# Patient Record
Sex: FEMALE | Race: WHITE
[De-identification: names, ages, dates, MRNs, and addresses within clinical notes are randomized per-mention and may not be internally consistent; named-entity substitution may affect disease eponyms.]

---

## 2017-04-26 ENCOUNTER — HOSPITAL ENCOUNTER (OUTPATIENT)
Dept: HOSPITAL 58 - AMBL | Age: 77
Discharge: HOME | End: 2017-04-26
Attending: INTERNAL MEDICINE

## 2017-04-26 ENCOUNTER — APPOINTMENT (OUTPATIENT)
Dept: GENERAL RADIOLOGY | Age: 77
End: 2017-04-26
Payer: MEDICARE

## 2017-04-26 ENCOUNTER — HOSPITAL ENCOUNTER (EMERGENCY)
Age: 77
Discharge: HOME | End: 2017-04-26
Attending: FAMILY MEDICINE
Payer: MEDICARE

## 2017-04-26 VITALS
SYSTOLIC BLOOD PRESSURE: 164 MMHG | DIASTOLIC BLOOD PRESSURE: 78 MMHG | TEMPERATURE: 98.3 F | OXYGEN SATURATION: 95 % | HEART RATE: 80 BPM | RESPIRATION RATE: 18 BRPM | WEIGHT: 125 LBS | HEIGHT: 61 IN | BODY MASS INDEX: 23.6 KG/M2

## 2017-04-26 VITALS — BODY MASS INDEX: 23.6 KG/M2

## 2017-04-26 DIAGNOSIS — R11.0: ICD-10-CM

## 2017-04-26 DIAGNOSIS — R53.1: Primary | ICD-10-CM

## 2017-04-26 DIAGNOSIS — R50.9: ICD-10-CM

## 2017-04-26 DIAGNOSIS — M54.41 CHRONIC BILATERAL LOW BACK PAIN WITH BILATERAL SCIATICA: ICD-10-CM

## 2017-04-26 DIAGNOSIS — R82.71 BACTERIA IN URINE: Primary | ICD-10-CM

## 2017-04-26 DIAGNOSIS — G89.29 CHRONIC BILATERAL LOW BACK PAIN WITH BILATERAL SCIATICA: ICD-10-CM

## 2017-04-26 DIAGNOSIS — R68.89: ICD-10-CM

## 2017-04-26 DIAGNOSIS — M54.42 CHRONIC BILATERAL LOW BACK PAIN WITH BILATERAL SCIATICA: ICD-10-CM

## 2017-04-26 DIAGNOSIS — I73.9: ICD-10-CM

## 2017-04-26 LAB
ALBUMIN SERPL-MCNC: 4.1 G/DL (ref 3.5–5.2)
ALP BLD-CCNC: 72 U/L (ref 35–104)
ALT SERPL-CCNC: 5 U/L (ref 5–33)
AMPHETAMINE SCREEN, URINE: NEGATIVE
ANION GAP SERPL CALCULATED.3IONS-SCNC: 17 MMOL/L (ref 7–19)
AST SERPL-CCNC: 15 U/L (ref 5–32)
BACTERIA: ABNORMAL /HPF
BARBITURATE SCREEN URINE: NEGATIVE
BASOPHILS ABSOLUTE: 0 K/UL (ref 0–0.2)
BASOPHILS RELATIVE PERCENT: 0.1 % (ref 0–1)
BENZODIAZEPINE SCREEN, URINE: POSITIVE
BILIRUB SERPL-MCNC: 0.6 MG/DL (ref 0.2–1.2)
BILIRUBIN URINE: NEGATIVE
BLOOD, URINE: ABNORMAL
BUN BLDV-MCNC: 21 MG/DL (ref 8–23)
CALCIUM SERPL-MCNC: 9.1 MG/DL (ref 8.8–10.2)
CANNABINOID SCREEN URINE: NEGATIVE
CHLORIDE BLD-SCNC: 96 MMOL/L (ref 98–111)
CLARITY: ABNORMAL
CO2: 24 MMOL/L (ref 22–29)
COCAINE METABOLITE SCREEN URINE: NEGATIVE
COLOR: YELLOW
CREAT SERPL-MCNC: 0.9 MG/DL (ref 0.5–0.9)
EOSINOPHILS ABSOLUTE: 0 K/UL (ref 0–0.6)
EOSINOPHILS RELATIVE PERCENT: 0.1 % (ref 0–5)
EPITHELIAL CELLS, UA: 17 /HPF (ref 0–5)
GFR NON-AFRICAN AMERICAN: >60
GLOBULIN: 3.1 G/DL
GLUCOSE BLD-MCNC: 111 MG/DL (ref 74–109)
GLUCOSE URINE: NEGATIVE MG/DL
HCT VFR BLD CALC: 49.6 % (ref 37–47)
HEMOGLOBIN: 16.4 G/DL (ref 12–16)
HYALINE CASTS: 6 /HPF (ref 0–8)
INR BLD: 0.99 (ref 0.88–1.18)
KETONES, URINE: 15 MG/DL
LEUKOCYTE ESTERASE, URINE: ABNORMAL
LYMPHOCYTES ABSOLUTE: 2.1 K/UL (ref 1.1–4.5)
LYMPHOCYTES RELATIVE PERCENT: 16.5 % (ref 20–40)
Lab: ABNORMAL
MCH RBC QN AUTO: 30.8 PG (ref 27–31)
MCHC RBC AUTO-ENTMCNC: 33.1 G/DL (ref 33–37)
MCV RBC AUTO: 93.1 FL (ref 81–99)
MONOCYTES ABSOLUTE: 0.8 K/UL (ref 0–0.9)
MONOCYTES RELATIVE PERCENT: 6.6 % (ref 0–10)
NEUTROPHILS ABSOLUTE: 9.6 K/UL (ref 1.5–7.5)
NEUTROPHILS RELATIVE PERCENT: 76.2 % (ref 50–65)
NITRITE, URINE: NEGATIVE
OPIATE SCREEN URINE: NEGATIVE
PDW BLD-RTO: 14.2 % (ref 11.5–14.5)
PH UA: 6
PLATELET # BLD: 191 K/UL (ref 130–400)
PMV BLD AUTO: 9.1 FL (ref 7.4–10.4)
POTASSIUM SERPL-SCNC: 4.1 MMOL/L (ref 3.5–5)
PROTEIN UA: 30 MG/DL
PROTHROMBIN TIME: 13.1 SEC (ref 12–14.6)
RBC # BLD: 5.33 M/UL (ref 4.2–5.4)
RBC UA: 23 /HPF (ref 0–4)
SODIUM BLD-SCNC: 137 MMOL/L (ref 136–145)
SPECIFIC GRAVITY UA: 1.02
TOTAL PROTEIN: 7.2 G/DL (ref 6.6–8.7)
UROBILINOGEN, URINE: 0.2 E.U./DL
WBC # BLD: 12.6 K/UL (ref 4.8–10.8)
WBC UA: 31 /HPF (ref 0–5)

## 2017-04-26 PROCEDURE — 6370000000 HC RX 637 (ALT 250 FOR IP): Performed by: FAMILY MEDICINE

## 2017-04-26 PROCEDURE — 6360000002 HC RX W HCPCS: Performed by: FAMILY MEDICINE

## 2017-04-26 PROCEDURE — 74022 RADEX COMPL AQT ABD SERIES: CPT

## 2017-04-26 PROCEDURE — 36415 COLL VENOUS BLD VENIPUNCTURE: CPT

## 2017-04-26 PROCEDURE — 85610 PROTHROMBIN TIME: CPT

## 2017-04-26 PROCEDURE — 81015 MICROSCOPIC EXAM OF URINE: CPT

## 2017-04-26 PROCEDURE — 2580000003 HC RX 258: Performed by: FAMILY MEDICINE

## 2017-04-26 PROCEDURE — 96375 TX/PRO/DX INJ NEW DRUG ADDON: CPT

## 2017-04-26 PROCEDURE — 96374 THER/PROPH/DIAG INJ IV PUSH: CPT

## 2017-04-26 PROCEDURE — 80053 COMPREHEN METABOLIC PANEL: CPT

## 2017-04-26 PROCEDURE — 99284 EMERGENCY DEPT VISIT MOD MDM: CPT

## 2017-04-26 PROCEDURE — 99283 EMERGENCY DEPT VISIT LOW MDM: CPT | Performed by: FAMILY MEDICINE

## 2017-04-26 PROCEDURE — 96376 TX/PRO/DX INJ SAME DRUG ADON: CPT

## 2017-04-26 PROCEDURE — 80307 DRUG TEST PRSMV CHEM ANLYZR: CPT

## 2017-04-26 PROCEDURE — 81003 URINALYSIS AUTO W/O SCOPE: CPT

## 2017-04-26 PROCEDURE — 85025 COMPLETE CBC W/AUTO DIFF WBC: CPT

## 2017-04-26 PROCEDURE — 87086 URINE CULTURE/COLONY COUNT: CPT

## 2017-04-26 RX ORDER — ALPRAZOLAM 0.5 MG/1
0.5 TABLET ORAL NIGHTLY PRN
COMMUNITY

## 2017-04-26 RX ORDER — HYDROCODONE BITARTRATE AND ACETAMINOPHEN 7.5; 325 MG/1; MG/1
1 TABLET ORAL EVERY 6 HOURS PRN
COMMUNITY

## 2017-04-26 RX ORDER — ONDANSETRON 2 MG/ML
4 INJECTION INTRAMUSCULAR; INTRAVENOUS ONCE
Status: COMPLETED | OUTPATIENT
Start: 2017-04-26 | End: 2017-04-26

## 2017-04-26 RX ORDER — LISINOPRIL 10 MG/1
10 TABLET ORAL DAILY
COMMUNITY

## 2017-04-26 RX ORDER — CLOPIDOGREL BISULFATE 75 MG/1
75 TABLET ORAL DAILY
COMMUNITY

## 2017-04-26 RX ORDER — NITROFURANTOIN 25; 75 MG/1; MG/1
100 CAPSULE ORAL ONCE
Status: COMPLETED | OUTPATIENT
Start: 2017-04-26 | End: 2017-04-26

## 2017-04-26 RX ORDER — ONDANSETRON 4 MG/1
4 TABLET, ORALLY DISINTEGRATING ORAL EVERY 8 HOURS PRN
Qty: 15 TABLET | Refills: 0 | Status: SHIPPED | OUTPATIENT
Start: 2017-04-26 | End: 2017-05-01

## 2017-04-26 RX ORDER — PANTOPRAZOLE SODIUM 20 MG/1
20 TABLET, DELAYED RELEASE ORAL DAILY
COMMUNITY

## 2017-04-26 RX ORDER — 0.9 % SODIUM CHLORIDE 0.9 %
1000 INTRAVENOUS SOLUTION INTRAVENOUS ONCE
Status: DISCONTINUED | OUTPATIENT
Start: 2017-04-26 | End: 2017-04-26

## 2017-04-26 RX ORDER — 0.9 % SODIUM CHLORIDE 0.9 %
1000 INTRAVENOUS SOLUTION INTRAVENOUS ONCE
Status: COMPLETED | OUTPATIENT
Start: 2017-04-26 | End: 2017-04-26

## 2017-04-26 RX ORDER — SIMVASTATIN 20 MG
20 TABLET ORAL NIGHTLY
COMMUNITY

## 2017-04-26 RX ORDER — NITROFURANTOIN 25; 75 MG/1; MG/1
100 CAPSULE ORAL 2 TIMES DAILY
Qty: 20 CAPSULE | Refills: 0 | Status: SHIPPED | OUTPATIENT
Start: 2017-04-26 | End: 2017-05-06

## 2017-04-26 RX ADMIN — ONDANSETRON 4 MG: 2 INJECTION INTRAMUSCULAR; INTRAVENOUS at 19:24

## 2017-04-26 RX ADMIN — HYDROMORPHONE HYDROCHLORIDE 1 MG: 1 INJECTION, SOLUTION INTRAMUSCULAR; INTRAVENOUS; SUBCUTANEOUS at 20:29

## 2017-04-26 RX ADMIN — ONDANSETRON 4 MG: 2 INJECTION INTRAMUSCULAR; INTRAVENOUS at 20:29

## 2017-04-26 RX ADMIN — NITROFURANTOIN (MONOHYDRATE/MACROCRYSTALS) 100 MG: 75; 25 CAPSULE ORAL at 20:29

## 2017-04-26 RX ADMIN — SODIUM CHLORIDE 1000 ML: 9 INJECTION, SOLUTION INTRAVENOUS at 19:24

## 2017-04-26 ASSESSMENT — ENCOUNTER SYMPTOMS
SORE THROAT: 0
ABDOMINAL PAIN: 1
NAUSEA: 1
DIARRHEA: 0
COUGH: 0

## 2017-04-27 ENCOUNTER — HOSPITAL ENCOUNTER (EMERGENCY)
Age: 77
Discharge: HOME | End: 2017-04-27
Attending: EMERGENCY MEDICINE
Payer: MEDICARE

## 2017-04-27 ENCOUNTER — APPOINTMENT (OUTPATIENT)
Dept: GENERAL RADIOLOGY | Age: 77
End: 2017-04-27
Payer: MEDICARE

## 2017-04-27 VITALS
RESPIRATION RATE: 18 BRPM | TEMPERATURE: 98.5 F | HEIGHT: 61 IN | SYSTOLIC BLOOD PRESSURE: 133 MMHG | DIASTOLIC BLOOD PRESSURE: 68 MMHG | BODY MASS INDEX: 22.84 KG/M2 | OXYGEN SATURATION: 97 % | HEART RATE: 78 BPM | WEIGHT: 121 LBS

## 2017-04-27 DIAGNOSIS — M54.40 LOW BACK PAIN WITH SCIATICA, SCIATICA LATERALITY UNSPECIFIED, UNSPECIFIED BACK PAIN LATERALITY, UNSPECIFIED CHRONICITY: Primary | ICD-10-CM

## 2017-04-27 LAB
ALBUMIN SERPL-MCNC: 4.3 G/DL (ref 3.5–5.2)
ALP BLD-CCNC: 72 U/L (ref 35–104)
ALT SERPL-CCNC: 5 U/L (ref 5–33)
ANION GAP SERPL CALCULATED.3IONS-SCNC: 17 MMOL/L (ref 7–19)
AST SERPL-CCNC: 16 U/L (ref 5–32)
BILIRUB SERPL-MCNC: 0.6 MG/DL (ref 0.2–1.2)
BUN BLDV-MCNC: 20 MG/DL (ref 8–23)
CALCIUM SERPL-MCNC: 9.5 MG/DL (ref 8.8–10.2)
CHLORIDE BLD-SCNC: 93 MMOL/L (ref 98–111)
CO2: 26 MMOL/L (ref 22–29)
CREAT SERPL-MCNC: 0.9 MG/DL (ref 0.5–0.9)
GFR NON-AFRICAN AMERICAN: >60
GLOBULIN: 3.2 G/DL
GLUCOSE BLD-MCNC: 93 MG/DL (ref 74–109)
HCT VFR BLD CALC: 50.3 % (ref 37–47)
HEMOGLOBIN: 16.7 G/DL (ref 12–16)
LIPASE: 39 U/L (ref 13–60)
MCH RBC QN AUTO: 30.9 PG (ref 27–31)
MCHC RBC AUTO-ENTMCNC: 33.2 G/DL (ref 33–37)
MCV RBC AUTO: 93.1 FL (ref 81–99)
PDW BLD-RTO: 14 % (ref 11.5–14.5)
PLATELET # BLD: 171 K/UL (ref 130–400)
PMV BLD AUTO: 8.9 FL (ref 7.4–10.4)
POTASSIUM SERPL-SCNC: 4.1 MMOL/L (ref 3.5–5)
RAPID INFLUENZA  B AGN: NEGATIVE
RAPID INFLUENZA A AGN: NEGATIVE
RBC # BLD: 5.4 M/UL (ref 4.2–5.4)
SODIUM BLD-SCNC: 136 MMOL/L (ref 136–145)
TOTAL CK: 43 U/L (ref 26–192)
TOTAL PROTEIN: 7.5 G/DL (ref 6.6–8.7)
WBC # BLD: 11.2 K/UL (ref 4.8–10.8)

## 2017-04-27 PROCEDURE — 71010 XR CHEST PORTABLE: CPT

## 2017-04-27 PROCEDURE — 96374 THER/PROPH/DIAG INJ IV PUSH: CPT

## 2017-04-27 PROCEDURE — 80053 COMPREHEN METABOLIC PANEL: CPT

## 2017-04-27 PROCEDURE — 82550 ASSAY OF CK (CPK): CPT

## 2017-04-27 PROCEDURE — 96375 TX/PRO/DX INJ NEW DRUG ADDON: CPT

## 2017-04-27 PROCEDURE — 87804 INFLUENZA ASSAY W/OPTIC: CPT

## 2017-04-27 PROCEDURE — 6360000002 HC RX W HCPCS: Performed by: EMERGENCY MEDICINE

## 2017-04-27 PROCEDURE — 99283 EMERGENCY DEPT VISIT LOW MDM: CPT | Performed by: EMERGENCY MEDICINE

## 2017-04-27 PROCEDURE — 85027 COMPLETE CBC AUTOMATED: CPT

## 2017-04-27 PROCEDURE — 99284 EMERGENCY DEPT VISIT MOD MDM: CPT

## 2017-04-27 PROCEDURE — 83690 ASSAY OF LIPASE: CPT

## 2017-04-27 PROCEDURE — 36415 COLL VENOUS BLD VENIPUNCTURE: CPT

## 2017-04-27 RX ORDER — ONDANSETRON 2 MG/ML
4 INJECTION INTRAMUSCULAR; INTRAVENOUS ONCE
Status: COMPLETED | OUTPATIENT
Start: 2017-04-27 | End: 2017-04-27

## 2017-04-27 RX ORDER — HYDROMORPHONE HYDROCHLORIDE 2 MG/1
2 TABLET ORAL
Qty: 15 TABLET | Refills: 0 | Status: SHIPPED | OUTPATIENT
Start: 2017-04-27 | End: 2017-05-04

## 2017-04-27 RX ADMIN — ONDANSETRON 4 MG: 2 INJECTION INTRAMUSCULAR; INTRAVENOUS at 19:46

## 2017-04-27 RX ADMIN — HYDROMORPHONE HYDROCHLORIDE 1 MG: 1 INJECTION, SOLUTION INTRAMUSCULAR; INTRAVENOUS; SUBCUTANEOUS at 19:46

## 2017-04-27 ASSESSMENT — PAIN SCALES - GENERAL
PAINLEVEL_OUTOF10: 10
PAINLEVEL_OUTOF10: 9
PAINLEVEL_OUTOF10: 0

## 2017-04-27 ASSESSMENT — PAIN DESCRIPTION - PAIN TYPE: TYPE: ACUTE PAIN

## 2017-04-27 ASSESSMENT — ENCOUNTER SYMPTOMS
EYE DISCHARGE: 0
CHOKING: 0
VOMITING: 1
EYE PAIN: 0
ABDOMINAL DISTENTION: 0
SHORTNESS OF BREATH: 0
EYE ITCHING: 0
EYES NEGATIVE: 1
ABDOMINAL PAIN: 0
APNEA: 0
EYE REDNESS: 0
RESPIRATORY NEGATIVE: 1
CHEST TIGHTNESS: 0
STRIDOR: 0
NAUSEA: 1
ANAL BLEEDING: 0

## 2017-04-28 LAB — URINE CULTURE, ROUTINE: NORMAL

## 2017-04-29 ENCOUNTER — APPOINTMENT (OUTPATIENT)
Dept: CT IMAGING | Facility: HOSPITAL | Age: 77
End: 2017-04-29

## 2017-04-29 ENCOUNTER — APPOINTMENT (OUTPATIENT)
Dept: GENERAL RADIOLOGY | Facility: HOSPITAL | Age: 77
End: 2017-04-29

## 2017-04-29 ENCOUNTER — HOSPITAL ENCOUNTER (EMERGENCY)
Facility: HOSPITAL | Age: 77
Discharge: HOME | End: 2017-04-29
Admitting: EMERGENCY MEDICINE

## 2017-04-29 ENCOUNTER — HOSPITAL ENCOUNTER (OUTPATIENT)
Dept: HOSPITAL 58 - AMBL | Age: 77
Discharge: HOME | End: 2017-04-29
Attending: INTERNAL MEDICINE

## 2017-04-29 VITALS
RESPIRATION RATE: 16 BRPM | SYSTOLIC BLOOD PRESSURE: 140 MMHG | HEIGHT: 61 IN | HEART RATE: 80 BPM | WEIGHT: 125 LBS | OXYGEN SATURATION: 95 % | TEMPERATURE: 98 F | BODY MASS INDEX: 23.6 KG/M2 | DIASTOLIC BLOOD PRESSURE: 70 MMHG

## 2017-04-29 VITALS — BODY MASS INDEX: 23.6 KG/M2

## 2017-04-29 DIAGNOSIS — R10.84 GENERALIZED ABDOMINAL PAIN: Primary | ICD-10-CM

## 2017-04-29 DIAGNOSIS — R53.1: ICD-10-CM

## 2017-04-29 DIAGNOSIS — N15.9: ICD-10-CM

## 2017-04-29 DIAGNOSIS — R11.2: Primary | ICD-10-CM

## 2017-04-29 DIAGNOSIS — M54.50 ACUTE BILATERAL LOW BACK PAIN WITHOUT SCIATICA: ICD-10-CM

## 2017-04-29 LAB
ALBUMIN SERPL-MCNC: 3.4 G/DL (ref 3.5–5)
ALBUMIN/GLOB SERPL: 1.1 G/DL (ref 1.1–2.5)
ALP SERPL-CCNC: 58 U/L (ref 24–120)
ALT SERPL W P-5'-P-CCNC: 15 U/L (ref 0–54)
AMYLASE SERPL-CCNC: 77 U/L (ref 30–110)
ANION GAP SERPL CALCULATED.3IONS-SCNC: 11 MMOL/L (ref 4–13)
APTT PPP: 24.2 SECONDS (ref 24.1–34.8)
AST SERPL-CCNC: 23 U/L (ref 7–45)
BACTERIA UR QL AUTO: ABNORMAL /HPF
BASOPHILS # BLD AUTO: 0.02 10*3/MM3 (ref 0–0.2)
BASOPHILS NFR BLD AUTO: 0.2 % (ref 0–2)
BILIRUB SERPL-MCNC: 0.6 MG/DL (ref 0.1–1)
BILIRUB UR QL STRIP: NEGATIVE
BUN BLD-MCNC: 24 MG/DL (ref 5–21)
BUN/CREAT SERPL: 28.9 (ref 7–25)
CALCIUM SPEC-SCNC: 8.5 MG/DL (ref 8.4–10.4)
CHLORIDE SERPL-SCNC: 99 MMOL/L (ref 98–110)
CLARITY UR: CLEAR
CO2 SERPL-SCNC: 26 MMOL/L (ref 24–31)
COLOR UR: YELLOW
CREAT BLD-MCNC: 0.83 MG/DL (ref 0.5–1.4)
CRP SERPL-MCNC: 2.4 MG/DL (ref 0–0.99)
D-LACTATE SERPL-SCNC: 1 MMOL/L (ref 0.5–2)
DEPRECATED RDW RBC AUTO: 46.4 FL (ref 40–54)
EOSINOPHIL # BLD AUTO: 0 10*3/MM3 (ref 0–0.7)
EOSINOPHIL NFR BLD AUTO: 0 % (ref 0–4)
ERYTHROCYTE [DISTWIDTH] IN BLOOD BY AUTOMATED COUNT: 14 % (ref 12–15)
GFR SERPL CREATININE-BSD FRML MDRD: 67 ML/MIN/1.73
GLOBULIN UR ELPH-MCNC: 3 GM/DL
GLUCOSE BLD-MCNC: 106 MG/DL (ref 70–100)
GLUCOSE UR STRIP-MCNC: NEGATIVE MG/DL
HCT VFR BLD AUTO: 43.2 % (ref 37–47)
HGB BLD-MCNC: 14.7 G/DL (ref 12–16)
HGB UR QL STRIP.AUTO: ABNORMAL
HYALINE CASTS UR QL AUTO: ABNORMAL /LPF
IMM GRANULOCYTES # BLD: 0.04 10*3/MM3 (ref 0–0.03)
IMM GRANULOCYTES NFR BLD: 0.4 % (ref 0–5)
INR PPP: 0.94 (ref 0.91–1.09)
KETONES UR QL STRIP: ABNORMAL
LEUKOCYTE ESTERASE UR QL STRIP.AUTO: NEGATIVE
LIPASE SERPL-CCNC: 98 U/L (ref 23–203)
LYMPHOCYTES # BLD AUTO: 1.94 10*3/MM3 (ref 0.72–4.86)
LYMPHOCYTES NFR BLD AUTO: 17.2 % (ref 15–45)
MCH RBC QN AUTO: 30.8 PG (ref 28–32)
MCHC RBC AUTO-ENTMCNC: 34 G/DL (ref 33–36)
MCV RBC AUTO: 90.4 FL (ref 82–98)
MONOCYTES # BLD AUTO: 0.79 10*3/MM3 (ref 0.19–1.3)
MONOCYTES NFR BLD AUTO: 7 % (ref 4–12)
NEUTROPHILS # BLD AUTO: 8.46 10*3/MM3 (ref 1.87–8.4)
NEUTROPHILS NFR BLD AUTO: 75.2 % (ref 39–78)
NITRITE UR QL STRIP: NEGATIVE
NT-PROBNP SERPL-MCNC: 287 PG/ML (ref 0–900)
PH UR STRIP.AUTO: 5.5 [PH] (ref 5–8)
PLATELET # BLD AUTO: 120 10*3/MM3 (ref 130–400)
PMV BLD AUTO: 9.8 FL (ref 6–12)
POTASSIUM BLD-SCNC: 4.8 MMOL/L (ref 3.5–5.3)
PROT SERPL-MCNC: 6.4 G/DL (ref 6.3–8.7)
PROT UR QL STRIP: NEGATIVE
PROTHROMBIN TIME: 12.9 SECONDS (ref 11.9–14.6)
RBC # BLD AUTO: 4.78 10*6/MM3 (ref 4.2–5.4)
RBC # UR: ABNORMAL /HPF
REF LAB TEST METHOD: ABNORMAL
SODIUM BLD-SCNC: 136 MMOL/L (ref 135–145)
SP GR UR STRIP: >=1.03 (ref 1–1.03)
SQUAMOUS #/AREA URNS HPF: ABNORMAL /HPF
TROPONIN I SERPL-MCNC: <0.012 NG/ML (ref 0–0.03)
UROBILINOGEN UR QL STRIP: ABNORMAL
WBC NRBC COR # BLD: 11.25 10*3/MM3 (ref 4.8–10.8)
WBC UR QL AUTO: ABNORMAL /HPF

## 2017-04-29 PROCEDURE — 96374 THER/PROPH/DIAG INJ IV PUSH: CPT

## 2017-04-29 PROCEDURE — 71010 HC CHEST PA OR AP: CPT

## 2017-04-29 PROCEDURE — 80053 COMPREHEN METABOLIC PANEL: CPT | Performed by: NURSE PRACTITIONER

## 2017-04-29 PROCEDURE — 93010 ELECTROCARDIOGRAM REPORT: CPT | Performed by: INTERNAL MEDICINE

## 2017-04-29 PROCEDURE — 0 IOPAMIDOL 61 % SOLUTION

## 2017-04-29 PROCEDURE — 83605 ASSAY OF LACTIC ACID: CPT | Performed by: NURSE PRACTITIONER

## 2017-04-29 PROCEDURE — 96361 HYDRATE IV INFUSION ADD-ON: CPT

## 2017-04-29 PROCEDURE — 85610 PROTHROMBIN TIME: CPT | Performed by: NURSE PRACTITIONER

## 2017-04-29 PROCEDURE — 82150 ASSAY OF AMYLASE: CPT | Performed by: NURSE PRACTITIONER

## 2017-04-29 PROCEDURE — 83690 ASSAY OF LIPASE: CPT | Performed by: NURSE PRACTITIONER

## 2017-04-29 PROCEDURE — 93005 ELECTROCARDIOGRAM TRACING: CPT | Performed by: NURSE PRACTITIONER

## 2017-04-29 PROCEDURE — 83880 ASSAY OF NATRIURETIC PEPTIDE: CPT | Performed by: NURSE PRACTITIONER

## 2017-04-29 PROCEDURE — 25010000002 HYDROMORPHONE PER 4 MG: Performed by: NURSE PRACTITIONER

## 2017-04-29 PROCEDURE — 71260 CT THORAX DX C+: CPT

## 2017-04-29 PROCEDURE — 81001 URINALYSIS AUTO W/SCOPE: CPT | Performed by: NURSE PRACTITIONER

## 2017-04-29 PROCEDURE — 74177 CT ABD & PELVIS W/CONTRAST: CPT

## 2017-04-29 PROCEDURE — 86140 C-REACTIVE PROTEIN: CPT | Performed by: NURSE PRACTITIONER

## 2017-04-29 PROCEDURE — 96375 TX/PRO/DX INJ NEW DRUG ADDON: CPT

## 2017-04-29 PROCEDURE — 25010000002 ONDANSETRON PER 1 MG: Performed by: NURSE PRACTITIONER

## 2017-04-29 PROCEDURE — 85025 COMPLETE CBC W/AUTO DIFF WBC: CPT | Performed by: NURSE PRACTITIONER

## 2017-04-29 PROCEDURE — 85730 THROMBOPLASTIN TIME PARTIAL: CPT | Performed by: NURSE PRACTITIONER

## 2017-04-29 PROCEDURE — 99284 EMERGENCY DEPT VISIT MOD MDM: CPT

## 2017-04-29 PROCEDURE — 84484 ASSAY OF TROPONIN QUANT: CPT | Performed by: NURSE PRACTITIONER

## 2017-04-29 RX ORDER — SODIUM CHLORIDE 0.9 % (FLUSH) 0.9 %
10 SYRINGE (ML) INJECTION AS NEEDED
Status: DISCONTINUED | OUTPATIENT
Start: 2017-04-29 | End: 2017-04-29 | Stop reason: HOSPADM

## 2017-04-29 RX ORDER — ONDANSETRON 4 MG/1
4 TABLET, ORALLY DISINTEGRATING ORAL EVERY 6 HOURS PRN
Qty: 12 TABLET | Refills: 0 | Status: SHIPPED | OUTPATIENT
Start: 2017-04-29 | End: 2017-05-02

## 2017-04-29 RX ORDER — ONDANSETRON 2 MG/ML
4 INJECTION INTRAMUSCULAR; INTRAVENOUS ONCE
Status: COMPLETED | OUTPATIENT
Start: 2017-04-29 | End: 2017-04-29

## 2017-04-29 RX ORDER — ONDANSETRON 2 MG/ML
4 INJECTION INTRAMUSCULAR; INTRAVENOUS ONCE
Status: DISCONTINUED | OUTPATIENT
Start: 2017-04-29 | End: 2017-04-29 | Stop reason: HOSPADM

## 2017-04-29 RX ORDER — HYDROCODONE BITARTRATE AND ACETAMINOPHEN 5; 325 MG/1; MG/1
1 TABLET ORAL EVERY 6 HOURS PRN
Qty: 12 TABLET | Refills: 0 | Status: SHIPPED | OUTPATIENT
Start: 2017-04-29 | End: 2017-05-02

## 2017-04-29 RX ADMIN — ONDANSETRON HYDROCHLORIDE 4 MG: 2 SOLUTION INTRAMUSCULAR; INTRAVENOUS at 16:46

## 2017-04-29 RX ADMIN — HYDROMORPHONE HYDROCHLORIDE 0.5 MG: 1 INJECTION, SOLUTION INTRAMUSCULAR; INTRAVENOUS; SUBCUTANEOUS at 19:50

## 2017-04-29 RX ADMIN — HYDROMORPHONE HYDROCHLORIDE 0.5 MG: 1 INJECTION, SOLUTION INTRAMUSCULAR; INTRAVENOUS; SUBCUTANEOUS at 16:46

## 2017-04-29 RX ADMIN — SODIUM CHLORIDE 500 ML: 0.9 INJECTION, SOLUTION INTRAVENOUS at 16:46

## 2017-05-03 ENCOUNTER — TRANSCRIBE ORDERS (OUTPATIENT)
Dept: ADMINISTRATIVE | Facility: HOSPITAL | Age: 77
End: 2017-05-03

## 2017-05-03 DIAGNOSIS — M54.9 CHRONIC BACK PAIN, UNSPECIFIED BACK LOCATION, UNSPECIFIED BACK PAIN LATERALITY: Primary | ICD-10-CM

## 2017-05-03 DIAGNOSIS — G89.29 CHRONIC BACK PAIN, UNSPECIFIED BACK LOCATION, UNSPECIFIED BACK PAIN LATERALITY: Primary | ICD-10-CM

## 2017-05-08 ENCOUNTER — APPOINTMENT (OUTPATIENT)
Dept: MRI IMAGING | Facility: HOSPITAL | Age: 77
End: 2017-05-08

## 2017-05-11 ENCOUNTER — HOSPITAL ENCOUNTER (OUTPATIENT)
Dept: MRI IMAGING | Facility: HOSPITAL | Age: 77
Discharge: HOME | End: 2017-05-11
Admitting: GENERAL PRACTICE

## 2017-05-11 DIAGNOSIS — M54.9 CHRONIC BACK PAIN, UNSPECIFIED BACK LOCATION, UNSPECIFIED BACK PAIN LATERALITY: ICD-10-CM

## 2017-05-11 DIAGNOSIS — G89.29 CHRONIC BACK PAIN, UNSPECIFIED BACK LOCATION, UNSPECIFIED BACK PAIN LATERALITY: ICD-10-CM

## 2017-05-11 PROCEDURE — 72148 MRI LUMBAR SPINE W/O DYE: CPT

## 2017-07-07 ENCOUNTER — TRANSCRIBE ORDERS (OUTPATIENT)
Dept: ADMINISTRATIVE | Facility: HOSPITAL | Age: 77
End: 2017-07-07

## 2017-07-07 DIAGNOSIS — I71.40 ABDOMINAL AORTIC ANEURYSM WITHOUT RUPTURE (HCC): ICD-10-CM

## 2017-07-07 DIAGNOSIS — G89.29 CHRONIC MIDLINE THORACIC BACK PAIN: ICD-10-CM

## 2017-07-07 DIAGNOSIS — M54.6 CHRONIC MIDLINE THORACIC BACK PAIN: ICD-10-CM

## 2017-07-10 ENCOUNTER — HOSPITAL ENCOUNTER (OUTPATIENT)
Dept: CT IMAGING | Facility: HOSPITAL | Age: 77
Discharge: HOME | End: 2017-07-10
Admitting: GENERAL PRACTICE

## 2017-07-10 DIAGNOSIS — I71.40 ABDOMINAL AORTIC ANEURYSM WITHOUT RUPTURE (HCC): ICD-10-CM

## 2017-07-10 DIAGNOSIS — G89.29 CHRONIC MIDLINE THORACIC BACK PAIN: ICD-10-CM

## 2017-07-10 DIAGNOSIS — M54.6 CHRONIC MIDLINE THORACIC BACK PAIN: ICD-10-CM

## 2017-07-10 PROCEDURE — 74150 CT ABDOMEN W/O CONTRAST: CPT

## 2017-07-10 PROCEDURE — 71250 CT THORAX DX C-: CPT

## 2018-02-22 ENCOUNTER — TRANSCRIBE ORDERS (OUTPATIENT)
Dept: ADMINISTRATIVE | Facility: HOSPITAL | Age: 78
End: 2018-02-22

## 2018-02-22 DIAGNOSIS — F17.210 CIGARETTE NICOTINE DEPENDENCE WITHOUT COMPLICATION: Primary | ICD-10-CM

## 2018-02-26 ENCOUNTER — HOSPITAL ENCOUNTER (OUTPATIENT)
Dept: CT IMAGING | Facility: HOSPITAL | Age: 78
Discharge: HOME | End: 2018-02-26
Admitting: GENERAL PRACTICE

## 2018-02-26 DIAGNOSIS — F17.210 CIGARETTE NICOTINE DEPENDENCE WITHOUT COMPLICATION: ICD-10-CM

## 2018-02-26 PROCEDURE — 71250 CT THORAX DX C-: CPT

## 2018-03-30 ENCOUNTER — APPOINTMENT (OUTPATIENT)
Dept: INTERVENTIONAL RADIOLOGY/VASCULAR | Facility: HOSPITAL | Age: 78
End: 2018-03-30

## 2018-03-30 ENCOUNTER — ANESTHESIA (OUTPATIENT)
Dept: PERIOP | Facility: HOSPITAL | Age: 78
End: 2018-03-30

## 2018-03-30 ENCOUNTER — HOSPITAL ENCOUNTER (INPATIENT)
Facility: HOSPITAL | Age: 78
LOS: 3 days | Discharge: HOME | End: 2018-04-02
Attending: EMERGENCY MEDICINE | Admitting: INTERNAL MEDICINE

## 2018-03-30 ENCOUNTER — HOSPITAL ENCOUNTER (OUTPATIENT)
Dept: HOSPITAL 58 - AMBL | Age: 78
Discharge: ANOTHER HOSPITAL | End: 2018-03-30
Attending: INTERNAL MEDICINE

## 2018-03-30 ENCOUNTER — APPOINTMENT (OUTPATIENT)
Dept: GENERAL RADIOLOGY | Facility: HOSPITAL | Age: 78
End: 2018-03-30

## 2018-03-30 ENCOUNTER — ANESTHESIA EVENT (OUTPATIENT)
Dept: PERIOP | Facility: HOSPITAL | Age: 78
End: 2018-03-30

## 2018-03-30 VITALS — BODY MASS INDEX: 23.6 KG/M2

## 2018-03-30 DIAGNOSIS — I21.11 ST ELEVATION MYOCARDIAL INFARCTION INVOLVING RIGHT CORONARY ARTERY (HCC): Primary | ICD-10-CM

## 2018-03-30 DIAGNOSIS — I25.2: ICD-10-CM

## 2018-03-30 DIAGNOSIS — Z95.1: ICD-10-CM

## 2018-03-30 DIAGNOSIS — R06.02: ICD-10-CM

## 2018-03-30 DIAGNOSIS — Z95.5: ICD-10-CM

## 2018-03-30 DIAGNOSIS — R53.1 GENERAL WEAKNESS: ICD-10-CM

## 2018-03-30 DIAGNOSIS — R07.9: Primary | ICD-10-CM

## 2018-03-30 LAB
ALBUMIN SERPL-MCNC: 3.3 G/DL (ref 3.5–5)
ALBUMIN/GLOB SERPL: 1.1 G/DL (ref 1.1–2.5)
ALP SERPL-CCNC: 65 U/L (ref 24–120)
ALT SERPL W P-5'-P-CCNC: 16 U/L (ref 0–54)
ANION GAP SERPL CALCULATED.3IONS-SCNC: 10 MMOL/L (ref 4–13)
AST SERPL-CCNC: 23 U/L (ref 7–45)
BASOPHILS # BLD AUTO: 0.05 10*3/MM3 (ref 0–0.2)
BASOPHILS NFR BLD AUTO: 0.6 % (ref 0–2)
BILIRUB SERPL-MCNC: 0.2 MG/DL (ref 0.1–1)
BUN BLD-MCNC: 18 MG/DL (ref 5–21)
BUN/CREAT SERPL: 20.7 (ref 7–25)
CALCIUM SPEC-SCNC: 9 MG/DL (ref 8.4–10.4)
CHLORIDE SERPL-SCNC: 101 MMOL/L (ref 98–110)
CO2 SERPL-SCNC: 28 MMOL/L (ref 24–31)
CREAT BLD-MCNC: 0.87 MG/DL (ref 0.5–1.4)
DEPRECATED RDW RBC AUTO: 51 FL (ref 40–54)
EOSINOPHIL # BLD AUTO: 0.21 10*3/MM3 (ref 0–0.7)
EOSINOPHIL NFR BLD AUTO: 2.4 % (ref 0–4)
ERYTHROCYTE [DISTWIDTH] IN BLOOD BY AUTOMATED COUNT: 14.8 % (ref 12–15)
GFR SERPL CREATININE-BSD FRML MDRD: 63 ML/MIN/1.73
GLOBULIN UR ELPH-MCNC: 3.1 GM/DL
GLUCOSE BLD-MCNC: 129 MG/DL (ref 70–100)
HCT VFR BLD AUTO: 38.4 % (ref 37–47)
HGB BLD-MCNC: 12.9 G/DL (ref 12–16)
HOLD SPECIMEN: NORMAL
HOLD SPECIMEN: NORMAL
IMM GRANULOCYTES # BLD: 0.02 10*3/MM3 (ref 0–0.03)
IMM GRANULOCYTES NFR BLD: 0.2 % (ref 0–5)
LYMPHOCYTES # BLD AUTO: 2.56 10*3/MM3 (ref 0.72–4.86)
LYMPHOCYTES NFR BLD AUTO: 29.8 % (ref 15–45)
MCH RBC QN AUTO: 31.2 PG (ref 28–32)
MCHC RBC AUTO-ENTMCNC: 33.6 G/DL (ref 33–36)
MCV RBC AUTO: 93 FL (ref 82–98)
MONOCYTES # BLD AUTO: 0.93 10*3/MM3 (ref 0.19–1.3)
MONOCYTES NFR BLD AUTO: 10.8 % (ref 4–12)
NEUTROPHILS # BLD AUTO: 4.82 10*3/MM3 (ref 1.87–8.4)
NEUTROPHILS NFR BLD AUTO: 56.2 % (ref 39–78)
NRBC BLD MANUAL-RTO: 0 /100 WBC (ref 0–0)
PLATELET # BLD AUTO: 290 10*3/MM3 (ref 130–400)
PMV BLD AUTO: 9.4 FL (ref 6–12)
POTASSIUM BLD-SCNC: 3.8 MMOL/L (ref 3.5–5.3)
PROT SERPL-MCNC: 6.4 G/DL (ref 6.3–8.7)
RBC # BLD AUTO: 4.13 10*6/MM3 (ref 4.2–5.4)
SODIUM BLD-SCNC: 139 MMOL/L (ref 135–145)
TROPONIN I SERPL-MCNC: 29.9 NG/ML (ref 0–0.03)
TROPONIN I SERPL-MCNC: 5.52 NG/ML (ref 0–0.03)
TROPONIN I SERPL-MCNC: <0.012 NG/ML (ref 0–0.03)
WBC NRBC COR # BLD: 8.59 10*3/MM3 (ref 4.8–10.8)
WHOLE BLOOD HOLD SPECIMEN: NORMAL
WHOLE BLOOD HOLD SPECIMEN: NORMAL

## 2018-03-30 PROCEDURE — 71045 X-RAY EXAM CHEST 1 VIEW: CPT

## 2018-03-30 PROCEDURE — 25010000002 IOPAMIDOL 61 % SOLUTION: Performed by: SURGERY

## 2018-03-30 PROCEDURE — 25010000002 VANCOMYCIN PER 500 MG: Performed by: SURGERY

## 2018-03-30 PROCEDURE — 0 IOPAMIDOL PER 1 ML: Performed by: INTERNAL MEDICINE

## 2018-03-30 PROCEDURE — 85025 COMPLETE CBC W/AUTO DIFF WBC: CPT | Performed by: EMERGENCY MEDICINE

## 2018-03-30 PROCEDURE — 25010000002 MIDAZOLAM PER 1 MG: Performed by: INTERNAL MEDICINE

## 2018-03-30 PROCEDURE — C1760 CLOSURE DEV, VASC: HCPCS | Performed by: SURGERY

## 2018-03-30 PROCEDURE — C1769 GUIDE WIRE: HCPCS | Performed by: INTERNAL MEDICINE

## 2018-03-30 PROCEDURE — 99223 1ST HOSP IP/OBS HIGH 75: CPT | Performed by: INTERNAL MEDICINE

## 2018-03-30 PROCEDURE — 93458 L HRT ARTERY/VENTRICLE ANGIO: CPT | Performed by: INTERNAL MEDICINE

## 2018-03-30 PROCEDURE — 84484 ASSAY OF TROPONIN QUANT: CPT | Performed by: EMERGENCY MEDICINE

## 2018-03-30 PROCEDURE — 25010000002 ONDANSETRON PER 1 MG: Performed by: NURSE ANESTHETIST, CERTIFIED REGISTERED

## 2018-03-30 PROCEDURE — L1830 KO IMMOB CANVAS LONG PRE OTS: HCPCS | Performed by: INTERNAL MEDICINE

## 2018-03-30 PROCEDURE — 92943 PRQ TRLUML REVSC CH OCC ANT: CPT | Performed by: INTERNAL MEDICINE

## 2018-03-30 PROCEDURE — 25010000002 MORPHINE SULFATE (PF) 2 MG/ML SOLUTION

## 2018-03-30 PROCEDURE — C1894 INTRO/SHEATH, NON-LASER: HCPCS | Performed by: INTERNAL MEDICINE

## 2018-03-30 PROCEDURE — 84484 ASSAY OF TROPONIN QUANT: CPT | Performed by: INTERNAL MEDICINE

## 2018-03-30 PROCEDURE — 25010000002 PROPOFOL 1000 MG/ML EMULSION: Performed by: NURSE ANESTHETIST, CERTIFIED REGISTERED

## 2018-03-30 PROCEDURE — 25010000002 MORPHINE SULFATE (PF) 2 MG/ML SOLUTION: Performed by: INTERNAL MEDICINE

## 2018-03-30 PROCEDURE — 027034Z DILATION OF CORONARY ARTERY, ONE ARTERY WITH DRUG-ELUTING INTRALUMINAL DEVICE, PERCUTANEOUS APPROACH: ICD-10-PCS | Performed by: INTERNAL MEDICINE

## 2018-03-30 PROCEDURE — 25010000002 TIROFIBAN 3.75 MG/15ML CONCENTRATION

## 2018-03-30 PROCEDURE — 25010000002 FENTANYL CITRATE (PF) 100 MCG/2ML SOLUTION: Performed by: NURSE ANESTHETIST, CERTIFIED REGISTERED

## 2018-03-30 PROCEDURE — 99152 MOD SED SAME PHYS/QHP 5/>YRS: CPT | Performed by: INTERNAL MEDICINE

## 2018-03-30 PROCEDURE — C1887 CATHETER, GUIDING: HCPCS | Performed by: SURGERY

## 2018-03-30 PROCEDURE — 25010000002 TIROFIBAN 3.75 MG/15ML CONCENTRATION: Performed by: INTERNAL MEDICINE

## 2018-03-30 PROCEDURE — 93005 ELECTROCARDIOGRAM TRACING: CPT | Performed by: EMERGENCY MEDICINE

## 2018-03-30 PROCEDURE — C1874 STENT, COATED/COV W/DEL SYS: HCPCS | Performed by: INTERNAL MEDICINE

## 2018-03-30 PROCEDURE — 35875 REMOVAL OF CLOT IN GRAFT: CPT | Performed by: SURGERY

## 2018-03-30 PROCEDURE — 25010000002 MIDAZOLAM PER 1 MG: Performed by: NURSE ANESTHETIST, CERTIFIED REGISTERED

## 2018-03-30 PROCEDURE — 36415 COLL VENOUS BLD VENIPUNCTURE: CPT | Performed by: EMERGENCY MEDICINE

## 2018-03-30 PROCEDURE — 25010000002 PROMETHAZINE PER 50 MG: Performed by: INTERNAL MEDICINE

## 2018-03-30 PROCEDURE — B2151ZZ FLUOROSCOPY OF LEFT HEART USING LOW OSMOLAR CONTRAST: ICD-10-PCS | Performed by: INTERNAL MEDICINE

## 2018-03-30 PROCEDURE — 76000 FLUOROSCOPY <1 HR PHYS/QHP: CPT

## 2018-03-30 PROCEDURE — 93005 ELECTROCARDIOGRAM TRACING: CPT | Performed by: INTERNAL MEDICINE

## 2018-03-30 PROCEDURE — 25010000002 MORPHINE PER 10 MG: Performed by: SURGERY

## 2018-03-30 PROCEDURE — B2111ZZ FLUOROSCOPY OF MULTIPLE CORONARY ARTERIES USING LOW OSMOLAR CONTRAST: ICD-10-PCS | Performed by: INTERNAL MEDICINE

## 2018-03-30 PROCEDURE — C1757 CATH, THROMBECTOMY/EMBOLECT: HCPCS | Performed by: SURGERY

## 2018-03-30 PROCEDURE — C1725 CATH, TRANSLUMIN NON-LASER: HCPCS | Performed by: INTERNAL MEDICINE

## 2018-03-30 PROCEDURE — C1887 CATHETER, GUIDING: HCPCS | Performed by: INTERNAL MEDICINE

## 2018-03-30 PROCEDURE — 04CL0ZZ EXTIRPATION OF MATTER FROM LEFT FEMORAL ARTERY, OPEN APPROACH: ICD-10-PCS | Performed by: SURGERY

## 2018-03-30 PROCEDURE — 80053 COMPREHEN METABOLIC PANEL: CPT | Performed by: EMERGENCY MEDICINE

## 2018-03-30 PROCEDURE — 99222 1ST HOSP IP/OBS MODERATE 55: CPT | Performed by: SURGERY

## 2018-03-30 PROCEDURE — 25010000002 ONDANSETRON PER 1 MG: Performed by: INTERNAL MEDICINE

## 2018-03-30 PROCEDURE — 25010000002 DIPHENHYDRAMINE PER 50 MG: Performed by: INTERNAL MEDICINE

## 2018-03-30 PROCEDURE — 25010000002 PHENYLEPHRINE PER 1 ML: Performed by: NURSE ANESTHETIST, CERTIFIED REGISTERED

## 2018-03-30 PROCEDURE — 25010000002 HEPARIN (PORCINE) PER 1000 UNITS: Performed by: NURSE ANESTHETIST, CERTIFIED REGISTERED

## 2018-03-30 PROCEDURE — 25010000002 FUROSEMIDE PER 20 MG: Performed by: INTERNAL MEDICINE

## 2018-03-30 PROCEDURE — 93010 ELECTROCARDIOGRAM REPORT: CPT | Performed by: INTERNAL MEDICINE

## 2018-03-30 PROCEDURE — 75716 ARTERY X-RAYS ARMS/LEGS: CPT

## 2018-03-30 PROCEDURE — 25010000002 HEPARIN (PORCINE) PER 1000 UNITS: Performed by: SURGERY

## 2018-03-30 PROCEDURE — 04CN0ZZ EXTIRPATION OF MATTER FROM LEFT POPLITEAL ARTERY, OPEN APPROACH: ICD-10-PCS | Performed by: SURGERY

## 2018-03-30 PROCEDURE — 99285 EMERGENCY DEPT VISIT HI MDM: CPT

## 2018-03-30 PROCEDURE — C9606 PERC D-E COR REVASC W AMI S: HCPCS | Performed by: INTERNAL MEDICINE

## 2018-03-30 PROCEDURE — 4A023N7 MEASUREMENT OF CARDIAC SAMPLING AND PRESSURE, LEFT HEART, PERCUTANEOUS APPROACH: ICD-10-PCS | Performed by: INTERNAL MEDICINE

## 2018-03-30 DEVICE — XIENCE ALPINE EVEROLIMUS ELUTING CORONARY STENT SYSTEM 2.75 MM X 28 MM / RAPID-EXCHANGE
Type: IMPLANTABLE DEVICE | Status: FUNCTIONAL
Brand: XIENCE ALPINE

## 2018-03-30 RX ORDER — MORPHINE SULFATE 2 MG/ML
2 INJECTION, SOLUTION INTRAMUSCULAR; INTRAVENOUS AS NEEDED
Status: DISCONTINUED | OUTPATIENT
Start: 2018-03-30 | End: 2018-03-30 | Stop reason: HOSPADM

## 2018-03-30 RX ORDER — DIPHENHYDRAMINE HYDROCHLORIDE 50 MG/ML
INJECTION INTRAMUSCULAR; INTRAVENOUS AS NEEDED
Status: DISCONTINUED | OUTPATIENT
Start: 2018-03-30 | End: 2018-03-30 | Stop reason: HOSPADM

## 2018-03-30 RX ORDER — HYDRALAZINE HYDROCHLORIDE 20 MG/ML
5 INJECTION INTRAMUSCULAR; INTRAVENOUS
Status: DISCONTINUED | OUTPATIENT
Start: 2018-03-30 | End: 2018-03-30 | Stop reason: HOSPADM

## 2018-03-30 RX ORDER — ENALAPRILAT 2.5 MG/2ML
1.25 INJECTION INTRAVENOUS EVERY 6 HOURS PRN
Status: DISCONTINUED | OUTPATIENT
Start: 2018-03-30 | End: 2018-04-02 | Stop reason: HOSPADM

## 2018-03-30 RX ORDER — ACETAMINOPHEN 325 MG/1
650 TABLET ORAL EVERY 4 HOURS PRN
Status: DISCONTINUED | OUTPATIENT
Start: 2018-03-30 | End: 2018-04-02 | Stop reason: HOSPADM

## 2018-03-30 RX ORDER — ACETAMINOPHEN 325 MG/1
650 TABLET ORAL EVERY 4 HOURS PRN
Status: DISCONTINUED | OUTPATIENT
Start: 2018-03-30 | End: 2018-03-30 | Stop reason: SDUPTHER

## 2018-03-30 RX ORDER — HYDROCODONE BITARTRATE AND ACETAMINOPHEN 7.5; 325 MG/1; MG/1
1 TABLET ORAL 4 TIMES DAILY
Status: ON HOLD | COMMUNITY
End: 2018-03-31

## 2018-03-30 RX ORDER — CLOPIDOGREL BISULFATE 75 MG/1
75 TABLET ORAL DAILY
COMMUNITY
End: 2018-04-02 | Stop reason: HOSPADM

## 2018-03-30 RX ORDER — MEPERIDINE HYDROCHLORIDE 25 MG/ML
12.5 INJECTION INTRAMUSCULAR; INTRAVENOUS; SUBCUTANEOUS
Status: DISCONTINUED | OUTPATIENT
Start: 2018-03-30 | End: 2018-03-30 | Stop reason: HOSPADM

## 2018-03-30 RX ORDER — LIDOCAINE HYDROCHLORIDE 20 MG/ML
INJECTION, SOLUTION INFILTRATION; PERINEURAL AS NEEDED
Status: DISCONTINUED | OUTPATIENT
Start: 2018-03-30 | End: 2018-03-30 | Stop reason: HOSPADM

## 2018-03-30 RX ORDER — ALPRAZOLAM 0.5 MG/1
0.5 TABLET ORAL NIGHTLY PRN
Status: DISCONTINUED | OUTPATIENT
Start: 2018-03-30 | End: 2018-04-02 | Stop reason: HOSPADM

## 2018-03-30 RX ORDER — VANCOMYCIN HYDROCHLORIDE 1 G/200ML
15 INJECTION, SOLUTION INTRAVENOUS ONCE
Status: COMPLETED | OUTPATIENT
Start: 2018-03-30 | End: 2018-03-30

## 2018-03-30 RX ORDER — CARVEDILOL 6.25 MG/1
6.25 TABLET ORAL 2 TIMES DAILY WITH MEALS
Status: DISCONTINUED | OUTPATIENT
Start: 2018-03-30 | End: 2018-04-01

## 2018-03-30 RX ORDER — MORPHINE SULFATE 2 MG/ML
1 INJECTION, SOLUTION INTRAMUSCULAR; INTRAVENOUS EVERY 4 HOURS PRN
Status: DISCONTINUED | OUTPATIENT
Start: 2018-03-30 | End: 2018-03-30

## 2018-03-30 RX ORDER — SODIUM CHLORIDE 0.9 % (FLUSH) 0.9 %
10 SYRINGE (ML) INJECTION AS NEEDED
Status: DISCONTINUED | OUTPATIENT
Start: 2018-03-30 | End: 2018-04-01

## 2018-03-30 RX ORDER — NALOXONE HCL 0.4 MG/ML
0.4 VIAL (ML) INJECTION
Status: DISCONTINUED | OUTPATIENT
Start: 2018-03-30 | End: 2018-04-02 | Stop reason: HOSPADM

## 2018-03-30 RX ORDER — AMOXICILLIN 250 MG
2 CAPSULE ORAL DAILY
COMMUNITY

## 2018-03-30 RX ORDER — MIDAZOLAM HYDROCHLORIDE 1 MG/ML
INJECTION INTRAMUSCULAR; INTRAVENOUS AS NEEDED
Status: DISCONTINUED | OUTPATIENT
Start: 2018-03-30 | End: 2018-03-30 | Stop reason: SURG

## 2018-03-30 RX ORDER — MORPHINE SULFATE 2 MG/ML
2 INJECTION, SOLUTION INTRAMUSCULAR; INTRAVENOUS EVERY 4 HOURS PRN
Status: DISCONTINUED | OUTPATIENT
Start: 2018-03-30 | End: 2018-04-02 | Stop reason: HOSPADM

## 2018-03-30 RX ORDER — ALPRAZOLAM 0.5 MG/1
0.5 TABLET ORAL DAILY PRN
COMMUNITY

## 2018-03-30 RX ORDER — ONDANSETRON 4 MG/1
4 TABLET, FILM COATED ORAL EVERY 6 HOURS PRN
Status: DISCONTINUED | OUTPATIENT
Start: 2018-03-30 | End: 2018-03-30

## 2018-03-30 RX ORDER — ONDANSETRON 4 MG/1
4 TABLET, ORALLY DISINTEGRATING ORAL EVERY 6 HOURS PRN
Status: DISCONTINUED | OUTPATIENT
Start: 2018-03-30 | End: 2018-04-02 | Stop reason: HOSPADM

## 2018-03-30 RX ORDER — SODIUM CHLORIDE, SODIUM LACTATE, POTASSIUM CHLORIDE, CALCIUM CHLORIDE 600; 310; 30; 20 MG/100ML; MG/100ML; MG/100ML; MG/100ML
INJECTION, SOLUTION INTRAVENOUS CONTINUOUS PRN
Status: DISCONTINUED | OUTPATIENT
Start: 2018-03-30 | End: 2018-03-30 | Stop reason: SURG

## 2018-03-30 RX ORDER — AMOXICILLIN 250 MG
1 CAPSULE ORAL 2 TIMES DAILY PRN
Status: DISCONTINUED | OUTPATIENT
Start: 2018-03-30 | End: 2018-04-02 | Stop reason: HOSPADM

## 2018-03-30 RX ORDER — CARVEDILOL 3.12 MG/1
3.12 TABLET ORAL 2 TIMES DAILY WITH MEALS
Status: DISCONTINUED | OUTPATIENT
Start: 2018-03-30 | End: 2018-03-30

## 2018-03-30 RX ORDER — ATORVASTATIN CALCIUM 10 MG/1
10 TABLET, FILM COATED ORAL DAILY
Status: DISCONTINUED | OUTPATIENT
Start: 2018-03-30 | End: 2018-03-30 | Stop reason: SDUPTHER

## 2018-03-30 RX ORDER — HYDROCODONE BITARTRATE AND ACETAMINOPHEN 7.5; 325 MG/1; MG/1
1 TABLET ORAL 4 TIMES DAILY
Status: DISCONTINUED | OUTPATIENT
Start: 2018-03-30 | End: 2018-04-02 | Stop reason: HOSPADM

## 2018-03-30 RX ORDER — MORPHINE SULFATE 2 MG/ML
2 INJECTION, SOLUTION INTRAMUSCULAR; INTRAVENOUS ONCE
Status: COMPLETED | OUTPATIENT
Start: 2018-03-30 | End: 2018-03-30

## 2018-03-30 RX ORDER — DIPHENHYDRAMINE HCL 25 MG
25 CAPSULE ORAL EVERY 6 HOURS PRN
Status: DISCONTINUED | OUTPATIENT
Start: 2018-03-30 | End: 2018-04-02 | Stop reason: HOSPADM

## 2018-03-30 RX ORDER — ONDANSETRON 2 MG/ML
4 INJECTION INTRAMUSCULAR; INTRAVENOUS EVERY 4 HOURS PRN
Status: DISCONTINUED | OUTPATIENT
Start: 2018-03-30 | End: 2018-04-02 | Stop reason: HOSPADM

## 2018-03-30 RX ORDER — PANTOPRAZOLE SODIUM 40 MG/1
40 TABLET, DELAYED RELEASE ORAL
COMMUNITY
Start: 2018-02-27

## 2018-03-30 RX ORDER — ONDANSETRON 2 MG/ML
4 INJECTION INTRAMUSCULAR; INTRAVENOUS EVERY 6 HOURS PRN
Status: DISCONTINUED | OUTPATIENT
Start: 2018-03-30 | End: 2018-03-30

## 2018-03-30 RX ORDER — AMLODIPINE BESYLATE 5 MG/1
5 TABLET ORAL DAILY
Status: DISCONTINUED | OUTPATIENT
Start: 2018-03-30 | End: 2018-04-02 | Stop reason: HOSPADM

## 2018-03-30 RX ORDER — MIDAZOLAM HYDROCHLORIDE 1 MG/ML
INJECTION INTRAMUSCULAR; INTRAVENOUS AS NEEDED
Status: DISCONTINUED | OUTPATIENT
Start: 2018-03-30 | End: 2018-03-30 | Stop reason: HOSPADM

## 2018-03-30 RX ORDER — HEPARIN SODIUM 1000 [USP'U]/ML
INJECTION, SOLUTION INTRAVENOUS; SUBCUTANEOUS AS NEEDED
Status: DISCONTINUED | OUTPATIENT
Start: 2018-03-30 | End: 2018-03-30 | Stop reason: SURG

## 2018-03-30 RX ORDER — CLOPIDOGREL BISULFATE 75 MG/1
75 TABLET ORAL DAILY
Status: DISCONTINUED | OUTPATIENT
Start: 2018-03-30 | End: 2018-03-30

## 2018-03-30 RX ORDER — MORPHINE SULFATE 4 MG/ML
4 INJECTION, SOLUTION INTRAMUSCULAR; INTRAVENOUS EVERY 4 HOURS PRN
Status: DISCONTINUED | OUTPATIENT
Start: 2018-03-30 | End: 2018-04-02 | Stop reason: HOSPADM

## 2018-03-30 RX ORDER — SODIUM CHLORIDE 9 MG/ML
100 INJECTION, SOLUTION INTRAVENOUS CONTINUOUS
Status: DISCONTINUED | OUTPATIENT
Start: 2018-03-30 | End: 2018-04-02 | Stop reason: HOSPADM

## 2018-03-30 RX ORDER — LIDOCAINE 50 MG/G
1 PATCH TOPICAL
Status: DISCONTINUED | OUTPATIENT
Start: 2018-03-30 | End: 2018-04-02 | Stop reason: HOSPADM

## 2018-03-30 RX ORDER — PROMETHAZINE HYDROCHLORIDE 25 MG/ML
12.5 INJECTION, SOLUTION INTRAMUSCULAR; INTRAVENOUS EVERY 6 HOURS PRN
Status: DISCONTINUED | OUTPATIENT
Start: 2018-03-30 | End: 2018-04-02 | Stop reason: HOSPADM

## 2018-03-30 RX ORDER — ATROPINE SULFATE 1 MG/ML
INJECTION, SOLUTION INTRAMUSCULAR; INTRAVENOUS; SUBCUTANEOUS AS NEEDED
Status: DISCONTINUED | OUTPATIENT
Start: 2018-03-30 | End: 2018-03-30 | Stop reason: HOSPADM

## 2018-03-30 RX ORDER — ATORVASTATIN CALCIUM 40 MG/1
40 TABLET, FILM COATED ORAL NIGHTLY
Status: DISCONTINUED | OUTPATIENT
Start: 2018-03-30 | End: 2018-04-02 | Stop reason: HOSPADM

## 2018-03-30 RX ORDER — FENTANYL CITRATE 50 UG/ML
INJECTION, SOLUTION INTRAMUSCULAR; INTRAVENOUS AS NEEDED
Status: DISCONTINUED | OUTPATIENT
Start: 2018-03-30 | End: 2018-03-30 | Stop reason: SURG

## 2018-03-30 RX ORDER — BUPIVACAINE HYDROCHLORIDE 5 MG/ML
INJECTION, SOLUTION PERINEURAL AS NEEDED
Status: DISCONTINUED | OUTPATIENT
Start: 2018-03-30 | End: 2018-03-30 | Stop reason: HOSPADM

## 2018-03-30 RX ORDER — NALOXONE HCL 0.4 MG/ML
0.04 VIAL (ML) INJECTION AS NEEDED
Status: DISCONTINUED | OUTPATIENT
Start: 2018-03-30 | End: 2018-03-30 | Stop reason: HOSPADM

## 2018-03-30 RX ORDER — ONDANSETRON 4 MG/1
4 TABLET, FILM COATED ORAL EVERY 6 HOURS PRN
Status: DISCONTINUED | OUTPATIENT
Start: 2018-03-30 | End: 2018-04-02 | Stop reason: HOSPADM

## 2018-03-30 RX ORDER — SENNA AND DOCUSATE SODIUM 50; 8.6 MG/1; MG/1
2 TABLET, FILM COATED ORAL NIGHTLY
Status: DISCONTINUED | OUTPATIENT
Start: 2018-03-30 | End: 2018-04-02 | Stop reason: HOSPADM

## 2018-03-30 RX ORDER — ONDANSETRON 2 MG/ML
4 INJECTION INTRAMUSCULAR; INTRAVENOUS AS NEEDED
Status: DISCONTINUED | OUTPATIENT
Start: 2018-03-30 | End: 2018-03-30 | Stop reason: HOSPADM

## 2018-03-30 RX ORDER — MORPHINE SULFATE 2 MG/ML
INJECTION, SOLUTION INTRAMUSCULAR; INTRAVENOUS
Status: COMPLETED
Start: 2018-03-30 | End: 2018-03-30

## 2018-03-30 RX ORDER — LORAZEPAM 1 MG/1
1 TABLET ORAL EVERY 6 HOURS PRN
Status: DISCONTINUED | OUTPATIENT
Start: 2018-03-30 | End: 2018-04-02 | Stop reason: HOSPADM

## 2018-03-30 RX ORDER — SIMVASTATIN 20 MG
20 TABLET ORAL
COMMUNITY
End: 2018-04-02 | Stop reason: HOSPADM

## 2018-03-30 RX ORDER — HYDROCODONE BITARTRATE AND ACETAMINOPHEN 5; 325 MG/1; MG/1
1 TABLET ORAL EVERY 4 HOURS PRN
Status: DISCONTINUED | OUTPATIENT
Start: 2018-03-30 | End: 2018-04-01 | Stop reason: SDUPTHER

## 2018-03-30 RX ORDER — FLUMAZENIL 0.1 MG/ML
0.2 INJECTION INTRAVENOUS AS NEEDED
Status: DISCONTINUED | OUTPATIENT
Start: 2018-03-30 | End: 2018-03-30 | Stop reason: HOSPADM

## 2018-03-30 RX ORDER — IPRATROPIUM BROMIDE AND ALBUTEROL SULFATE 2.5; .5 MG/3ML; MG/3ML
3 SOLUTION RESPIRATORY (INHALATION) ONCE AS NEEDED
Status: DISCONTINUED | OUTPATIENT
Start: 2018-03-30 | End: 2018-03-30 | Stop reason: HOSPADM

## 2018-03-30 RX ORDER — METOCLOPRAMIDE HYDROCHLORIDE 5 MG/ML
5 INJECTION INTRAMUSCULAR; INTRAVENOUS
Status: DISCONTINUED | OUTPATIENT
Start: 2018-03-30 | End: 2018-03-30 | Stop reason: HOSPADM

## 2018-03-30 RX ORDER — NALOXONE HCL 0.4 MG/ML
0.4 VIAL (ML) INJECTION
Status: DISCONTINUED | OUTPATIENT
Start: 2018-03-30 | End: 2018-03-30

## 2018-03-30 RX ORDER — ONDANSETRON 4 MG/1
4 TABLET, ORALLY DISINTEGRATING ORAL EVERY 6 HOURS PRN
Status: DISCONTINUED | OUTPATIENT
Start: 2018-03-30 | End: 2018-03-30

## 2018-03-30 RX ORDER — PANTOPRAZOLE SODIUM 40 MG/1
40 TABLET, DELAYED RELEASE ORAL
Status: DISCONTINUED | OUTPATIENT
Start: 2018-03-30 | End: 2018-04-02 | Stop reason: HOSPADM

## 2018-03-30 RX ORDER — SODIUM CHLORIDE 0.9 % (FLUSH) 0.9 %
1-10 SYRINGE (ML) INJECTION AS NEEDED
Status: DISCONTINUED | OUTPATIENT
Start: 2018-03-30 | End: 2018-04-02 | Stop reason: HOSPADM

## 2018-03-30 RX ORDER — FUROSEMIDE 10 MG/ML
INJECTION INTRAMUSCULAR; INTRAVENOUS AS NEEDED
Status: DISCONTINUED | OUTPATIENT
Start: 2018-03-30 | End: 2018-03-30 | Stop reason: HOSPADM

## 2018-03-30 RX ADMIN — SODIUM CHLORIDE 100 ML/HR: 9 INJECTION, SOLUTION INTRAVENOUS at 14:19

## 2018-03-30 RX ADMIN — VANCOMYCIN HYDROCHLORIDE 1000 MG: 1 INJECTION, SOLUTION INTRAVENOUS at 14:19

## 2018-03-30 RX ADMIN — MIDAZOLAM HYDROCHLORIDE 3 MG: 1 INJECTION, SOLUTION INTRAMUSCULAR; INTRAVENOUS at 10:34

## 2018-03-30 RX ADMIN — PROMETHAZINE HYDROCHLORIDE 12.5 MG: 25 INJECTION INTRAMUSCULAR; INTRAVENOUS at 22:00

## 2018-03-30 RX ADMIN — PHENYLEPHRINE HYDROCHLORIDE 40 MCG: 10 INJECTION INTRAVENOUS at 11:40

## 2018-03-30 RX ADMIN — LORAZEPAM 1 MG: 1 TABLET ORAL at 21:31

## 2018-03-30 RX ADMIN — ACETAMINOPHEN 650 MG: 325 TABLET, FILM COATED ORAL at 09:33

## 2018-03-30 RX ADMIN — MORPHINE SULFATE 2 MG: 2 INJECTION, SOLUTION INTRAMUSCULAR; INTRAVENOUS at 15:00

## 2018-03-30 RX ADMIN — MORPHINE SULFATE 2 MG: 2 INJECTION, SOLUTION INTRAMUSCULAR; INTRAVENOUS at 08:00

## 2018-03-30 RX ADMIN — MORPHINE SULFATE 1 MG: 2 INJECTION, SOLUTION INTRAMUSCULAR; INTRAVENOUS at 09:52

## 2018-03-30 RX ADMIN — ONDANSETRON 4 MG: 2 INJECTION INTRAMUSCULAR; INTRAVENOUS at 12:27

## 2018-03-30 RX ADMIN — FENTANYL CITRATE 100 MCG: 50 INJECTION, SOLUTION INTRAMUSCULAR; INTRAVENOUS at 10:44

## 2018-03-30 RX ADMIN — AMLODIPINE BESYLATE 5 MG: 5 TABLET ORAL at 14:15

## 2018-03-30 RX ADMIN — ONDANSETRON HYDROCHLORIDE 4 MG: 2 INJECTION INTRAMUSCULAR; INTRAVENOUS at 17:55

## 2018-03-30 RX ADMIN — ENALAPRILAT 1.25 MG: 1.25 INJECTION INTRAVENOUS at 21:56

## 2018-03-30 RX ADMIN — PANTOPRAZOLE SODIUM 40 MG: 40 TABLET, DELAYED RELEASE ORAL at 14:15

## 2018-03-30 RX ADMIN — LIDOCAINE 1 PATCH: 50 PATCH TOPICAL at 18:01

## 2018-03-30 RX ADMIN — PHENYLEPHRINE HYDROCHLORIDE 160 MCG: 10 INJECTION INTRAVENOUS at 11:46

## 2018-03-30 RX ADMIN — ONDANSETRON HYDROCHLORIDE 4 MG: 2 INJECTION INTRAMUSCULAR; INTRAVENOUS at 21:17

## 2018-03-30 RX ADMIN — PHENYLEPHRINE HYDROCHLORIDE 1 MCG: 10 INJECTION INTRAVENOUS at 10:50

## 2018-03-30 RX ADMIN — PHENYLEPHRINE HYDROCHLORIDE 40 MCG: 10 INJECTION INTRAVENOUS at 11:37

## 2018-03-30 RX ADMIN — SODIUM CHLORIDE, POTASSIUM CHLORIDE, SODIUM LACTATE AND CALCIUM CHLORIDE: 600; 310; 30; 20 INJECTION, SOLUTION INTRAVENOUS at 10:34

## 2018-03-30 RX ADMIN — PROPOFOL 50 MCG/KG/MIN: 10 INJECTION, EMULSION INTRAVENOUS at 10:44

## 2018-03-30 RX ADMIN — DESMOPRESSIN ACETATE 40 MG: 0.2 TABLET ORAL at 21:17

## 2018-03-30 RX ADMIN — MORPHINE SULFATE 4 MG: 4 INJECTION, SOLUTION INTRAMUSCULAR; INTRAVENOUS at 18:44

## 2018-03-30 RX ADMIN — CARVEDILOL 6.25 MG: 6.25 TABLET, FILM COATED ORAL at 21:17

## 2018-03-30 RX ADMIN — SODIUM CHLORIDE, POTASSIUM CHLORIDE, SODIUM LACTATE AND CALCIUM CHLORIDE: 600; 310; 30; 20 INJECTION, SOLUTION INTRAVENOUS at 10:35

## 2018-03-30 RX ADMIN — PHENYLEPHRINE HYDROCHLORIDE 80 MCG: 10 INJECTION INTRAVENOUS at 11:41

## 2018-03-30 RX ADMIN — HYDROCODONE BITARTRATE AND ACETAMINOPHEN 1 TABLET: 7.5; 325 TABLET ORAL at 14:16

## 2018-03-30 RX ADMIN — HYDROCODONE BITARTRATE AND ACETAMINOPHEN 1 TABLET: 7.5; 325 TABLET ORAL at 21:17

## 2018-03-30 RX ADMIN — HEPARIN SODIUM 5000 UNITS: 1000 INJECTION, SOLUTION INTRAVENOUS; SUBCUTANEOUS at 11:03

## 2018-03-30 RX ADMIN — DOCUSATE SODIUM AND SENNOSIDES 2 TABLET: 8.6; 5 TABLET, FILM COATED ORAL at 21:17

## 2018-03-30 RX ADMIN — TICAGRELOR 90 MG: 90 TABLET ORAL at 21:17

## 2018-03-30 RX ADMIN — MIDAZOLAM HYDROCHLORIDE 2 MG: 1 INJECTION, SOLUTION INTRAMUSCULAR; INTRAVENOUS at 10:44

## 2018-03-30 NOTE — ANESTHESIA POSTPROCEDURE EVALUATION
"Patient: Zo Stout    Procedure Summary     Date:  03/30/18 Room / Location:  [facility] OR  /  [facility] OR    Anesthesia Start:  1034 Anesthesia Stop:  1158    Procedure:  LEFT LOWER EXTREMITY THROMBECTOMY/EMBOLECTOMY (Left Groin) Diagnosis:      Surgeon:  Nicolás Alexis DO Provider:  León Roberts CRNA    Anesthesia Type:  MAC ASA Status:  4 - Emergent          Anesthesia Type: MAC  Last vitals  BP   118/57 (03/30/18 1231)   Temp   97.3 °F (36.3 °C) (03/30/18 1231)   Pulse   73 (03/30/18 1231)   Resp   24 (03/30/18 1231)     SpO2   97 % (03/30/18 1231)     Post Anesthesia Care and Evaluation    Patient location during evaluation: PACU  Patient participation: complete - patient participated  Level of consciousness: awake and alert  Pain management: adequate  Airway patency: patent  Anesthetic complications: No anesthetic complications  PONV Status: controlled  Cardiovascular status: acceptable and hemodynamically stable  Respiratory status: acceptable  Hydration status: acceptable    Comments: Patient discharged from PACU prior to anesthesia evaluation based on Darlene Score.  For details, see RN note.     /57 (Patient Position: Lying)   Pulse 73   Temp 97.3 °F (36.3 °C) (Temporal Artery )   Resp 24   Ht 154.9 cm (61\")   Wt 70 kg (154 lb 6.4 oz)   SpO2 97%   BMI 29.17 kg/m²       "

## 2018-03-30 NOTE — ANESTHESIA PREPROCEDURE EVALUATION
Anesthesia Evaluation     no history of anesthetic complications:  NPO Solid Status: > 6 hours  NPO Liquid Status: > 6 hours           Airway   Mallampati: II  TM distance: >3 FB  Neck ROM: full  Dental    (+) upper dentures and lower dentures    Pulmonary - normal exam    breath sounds clear to auscultation  (+) a smoker (1 ppd) Current, COPD, recent URI (bronchitis 2-3 weeks ago, still with productive cough),   (-) sleep apnea  Cardiovascular - normal exam  Exercise tolerance: poor (<4 METS) (Limited by SOB, hip pain)    ECG reviewed  Rhythm: regular  Rate: normal    (+) past MI (MI this morning) , cardiac stents (x1, this morning) Drug eluting stent angina,   (-) pacemaker      Neuro/Psych  (-) seizures, TIA, CVA  GI/Hepatic/Renal/Endo    (-) GERD, liver disease, no renal disease, diabetes, hypothyroidism, hyperthyroidism    Musculoskeletal     Abdominal    Substance History      OB/GYN          Other                        Anesthesia Plan    ASA 4 - emergent     MAC   total IV anesthesia(Will attempt MAC.  If patient is unable to tolerate the procedure, will convert to GETA.)  intravenous induction   Anesthetic plan and risks discussed with patient.

## 2018-03-31 LAB
ANION GAP SERPL CALCULATED.3IONS-SCNC: 7 MMOL/L (ref 4–13)
BUN BLD-MCNC: 15 MG/DL (ref 5–21)
BUN/CREAT SERPL: 20 (ref 7–25)
CALCIUM SPEC-SCNC: 8.4 MG/DL (ref 8.4–10.4)
CHLORIDE SERPL-SCNC: 101 MMOL/L (ref 98–110)
CO2 SERPL-SCNC: 29 MMOL/L (ref 24–31)
CREAT BLD-MCNC: 0.75 MG/DL (ref 0.5–1.4)
DEPRECATED RDW RBC AUTO: 49.5 FL (ref 40–54)
ERYTHROCYTE [DISTWIDTH] IN BLOOD BY AUTOMATED COUNT: 14.8 % (ref 12–15)
GFR SERPL CREATININE-BSD FRML MDRD: 75 ML/MIN/1.73
GLUCOSE BLD-MCNC: 126 MG/DL (ref 70–100)
HCT VFR BLD AUTO: 35.1 % (ref 37–47)
HGB BLD-MCNC: 11.8 G/DL (ref 12–16)
MCH RBC QN AUTO: 30.7 PG (ref 28–32)
MCHC RBC AUTO-ENTMCNC: 33.6 G/DL (ref 33–36)
MCV RBC AUTO: 91.4 FL (ref 82–98)
PLATELET # BLD AUTO: 278 10*3/MM3 (ref 130–400)
PMV BLD AUTO: 9.7 FL (ref 6–12)
POTASSIUM BLD-SCNC: 4 MMOL/L (ref 3.5–5.3)
RBC # BLD AUTO: 3.84 10*6/MM3 (ref 4.2–5.4)
SODIUM BLD-SCNC: 137 MMOL/L (ref 135–145)
TROPONIN I SERPL-MCNC: 18.3 NG/ML (ref 0–0.03)
TROPONIN I SERPL-MCNC: 27.3 NG/ML (ref 0–0.03)
WBC NRBC COR # BLD: 7.36 10*3/MM3 (ref 4.8–10.8)

## 2018-03-31 PROCEDURE — 84484 ASSAY OF TROPONIN QUANT: CPT | Performed by: INTERNAL MEDICINE

## 2018-03-31 PROCEDURE — 99232 SBSQ HOSP IP/OBS MODERATE 35: CPT | Performed by: INTERNAL MEDICINE

## 2018-03-31 PROCEDURE — 99024 POSTOP FOLLOW-UP VISIT: CPT | Performed by: SURGERY

## 2018-03-31 PROCEDURE — 80048 BASIC METABOLIC PNL TOTAL CA: CPT | Performed by: INTERNAL MEDICINE

## 2018-03-31 PROCEDURE — 93005 ELECTROCARDIOGRAM TRACING: CPT | Performed by: INTERNAL MEDICINE

## 2018-03-31 PROCEDURE — 85027 COMPLETE CBC AUTOMATED: CPT | Performed by: INTERNAL MEDICINE

## 2018-03-31 PROCEDURE — 25010000002 ONDANSETRON PER 1 MG: Performed by: INTERNAL MEDICINE

## 2018-03-31 PROCEDURE — 93010 ELECTROCARDIOGRAM REPORT: CPT | Performed by: INTERNAL MEDICINE

## 2018-03-31 PROCEDURE — 25010000002 MORPHINE PER 10 MG: Performed by: SURGERY

## 2018-03-31 PROCEDURE — 25010000002 PROMETHAZINE PER 50 MG: Performed by: INTERNAL MEDICINE

## 2018-03-31 RX ORDER — ASPIRIN 81 MG/1
81 TABLET ORAL DAILY
Status: DISCONTINUED | OUTPATIENT
Start: 2018-03-31 | End: 2018-04-02 | Stop reason: HOSPADM

## 2018-03-31 RX ORDER — HYDROCODONE BITARTRATE AND ACETAMINOPHEN 10; 325 MG/1; MG/1
1 TABLET ORAL 2 TIMES DAILY PRN
COMMUNITY

## 2018-03-31 RX ADMIN — TICAGRELOR 90 MG: 90 TABLET ORAL at 08:07

## 2018-03-31 RX ADMIN — AMLODIPINE BESYLATE 5 MG: 5 TABLET ORAL at 08:08

## 2018-03-31 RX ADMIN — PROMETHAZINE HYDROCHLORIDE 12.5 MG: 25 INJECTION INTRAMUSCULAR; INTRAVENOUS at 16:06

## 2018-03-31 RX ADMIN — SODIUM CHLORIDE 100 ML/HR: 9 INJECTION, SOLUTION INTRAVENOUS at 22:52

## 2018-03-31 RX ADMIN — SODIUM CHLORIDE 100 ML/HR: 9 INJECTION, SOLUTION INTRAVENOUS at 12:23

## 2018-03-31 RX ADMIN — MORPHINE SULFATE 4 MG: 4 INJECTION, SOLUTION INTRAMUSCULAR; INTRAVENOUS at 03:14

## 2018-03-31 RX ADMIN — ONDANSETRON HYDROCHLORIDE 4 MG: 2 INJECTION INTRAMUSCULAR; INTRAVENOUS at 21:43

## 2018-03-31 RX ADMIN — HYDROCODONE BITARTRATE AND ACETAMINOPHEN 1 TABLET: 7.5; 325 TABLET ORAL at 13:04

## 2018-03-31 RX ADMIN — TICAGRELOR 90 MG: 90 TABLET ORAL at 22:46

## 2018-03-31 RX ADMIN — PANTOPRAZOLE SODIUM 40 MG: 40 TABLET, DELAYED RELEASE ORAL at 08:07

## 2018-03-31 RX ADMIN — CARVEDILOL 6.25 MG: 6.25 TABLET, FILM COATED ORAL at 17:46

## 2018-03-31 RX ADMIN — HYDROCODONE BITARTRATE AND ACETAMINOPHEN 1 TABLET: 7.5; 325 TABLET ORAL at 21:38

## 2018-03-31 RX ADMIN — ONDANSETRON HYDROCHLORIDE 4 MG: 2 INJECTION INTRAMUSCULAR; INTRAVENOUS at 13:10

## 2018-03-31 RX ADMIN — LIDOCAINE 1 PATCH: 50 PATCH TOPICAL at 06:25

## 2018-03-31 RX ADMIN — HYDROCODONE BITARTRATE AND ACETAMINOPHEN 1 TABLET: 7.5; 325 TABLET ORAL at 17:54

## 2018-03-31 RX ADMIN — ASPIRIN 81 MG: 81 TABLET ORAL at 13:05

## 2018-03-31 RX ADMIN — DOCUSATE SODIUM AND SENNOSIDES 2 TABLET: 8.6; 5 TABLET, FILM COATED ORAL at 21:38

## 2018-03-31 RX ADMIN — LIDOCAINE 1 PATCH: 50 PATCH TOPICAL at 18:36

## 2018-03-31 RX ADMIN — HYDROCODONE BITARTRATE AND ACETAMINOPHEN 1 TABLET: 7.5; 325 TABLET ORAL at 08:04

## 2018-03-31 RX ADMIN — CARVEDILOL 6.25 MG: 6.25 TABLET, FILM COATED ORAL at 08:08

## 2018-03-31 RX ADMIN — ALPRAZOLAM 0.5 MG: 0.5 TABLET ORAL at 21:43

## 2018-03-31 RX ADMIN — MORPHINE SULFATE 4 MG: 4 INJECTION, SOLUTION INTRAMUSCULAR; INTRAVENOUS at 23:53

## 2018-03-31 RX ADMIN — NICARDIPINE HYDROCHLORIDE 5 MG/HR: 25 INJECTION INTRAVENOUS at 03:30

## 2018-03-31 RX ADMIN — PROMETHAZINE HYDROCHLORIDE 12.5 MG: 25 INJECTION INTRAMUSCULAR; INTRAVENOUS at 06:23

## 2018-03-31 RX ADMIN — DESMOPRESSIN ACETATE 40 MG: 0.2 TABLET ORAL at 21:39

## 2018-04-01 PROBLEM — J42 CHRONIC BRONCHITIS (HCC): Status: ACTIVE | Noted: 2018-04-01

## 2018-04-01 PROBLEM — Z72.0 TOBACCO CONSUMPTION: Status: ACTIVE | Noted: 2018-04-01

## 2018-04-01 PROBLEM — I73.9 PAD (PERIPHERAL ARTERY DISEASE) (HCC): Status: ACTIVE | Noted: 2018-04-01

## 2018-04-01 PROCEDURE — 63710000001 DIPHENHYDRAMINE PER 50 MG: Performed by: INTERNAL MEDICINE

## 2018-04-01 PROCEDURE — G8980 MOBILITY D/C STATUS: HCPCS | Performed by: PHYSICAL THERAPIST

## 2018-04-01 PROCEDURE — G8978 MOBILITY CURRENT STATUS: HCPCS | Performed by: PHYSICAL THERAPIST

## 2018-04-01 PROCEDURE — 99232 SBSQ HOSP IP/OBS MODERATE 35: CPT | Performed by: INTERNAL MEDICINE

## 2018-04-01 PROCEDURE — 97161 PT EVAL LOW COMPLEX 20 MIN: CPT | Performed by: PHYSICAL THERAPIST

## 2018-04-01 PROCEDURE — G8979 MOBILITY GOAL STATUS: HCPCS | Performed by: PHYSICAL THERAPIST

## 2018-04-01 RX ORDER — CARVEDILOL 6.25 MG/1
12.5 TABLET ORAL 2 TIMES DAILY WITH MEALS
Status: DISCONTINUED | OUTPATIENT
Start: 2018-04-01 | End: 2018-04-02 | Stop reason: HOSPADM

## 2018-04-01 RX ORDER — HYDROCODONE BITARTRATE AND ACETAMINOPHEN 10; 325 MG/1; MG/1
1 TABLET ORAL 2 TIMES DAILY PRN
Status: DISCONTINUED | OUTPATIENT
Start: 2018-04-01 | End: 2018-04-02 | Stop reason: HOSPADM

## 2018-04-01 RX ORDER — LISINOPRIL 5 MG/1
5 TABLET ORAL
Status: DISCONTINUED | OUTPATIENT
Start: 2018-04-01 | End: 2018-04-02 | Stop reason: HOSPADM

## 2018-04-01 RX ADMIN — DOCUSATE SODIUM AND SENNOSIDES 2 TABLET: 8.6; 5 TABLET, FILM COATED ORAL at 20:52

## 2018-04-01 RX ADMIN — DIPHENHYDRAMINE HYDROCHLORIDE 25 MG: 25 CAPSULE ORAL at 20:51

## 2018-04-01 RX ADMIN — TICAGRELOR 90 MG: 90 TABLET ORAL at 20:51

## 2018-04-01 RX ADMIN — HYDROCODONE BITARTRATE AND ACETAMINOPHEN 1 TABLET: 7.5; 325 TABLET ORAL at 08:39

## 2018-04-01 RX ADMIN — LORAZEPAM 1 MG: 1 TABLET ORAL at 20:51

## 2018-04-01 RX ADMIN — DESMOPRESSIN ACETATE 40 MG: 0.2 TABLET ORAL at 20:52

## 2018-04-01 RX ADMIN — TICAGRELOR 90 MG: 90 TABLET ORAL at 08:40

## 2018-04-01 RX ADMIN — HYDROCODONE BITARTRATE AND ACETAMINOPHEN 1 TABLET: 7.5; 325 TABLET ORAL at 18:03

## 2018-04-01 RX ADMIN — CARVEDILOL 12.5 MG: 6.25 TABLET, FILM COATED ORAL at 18:03

## 2018-04-01 RX ADMIN — ASPIRIN 81 MG: 81 TABLET ORAL at 08:40

## 2018-04-01 RX ADMIN — CARVEDILOL 6.25 MG: 6.25 TABLET, FILM COATED ORAL at 08:40

## 2018-04-01 RX ADMIN — AMLODIPINE BESYLATE 5 MG: 5 TABLET ORAL at 08:40

## 2018-04-01 RX ADMIN — LISINOPRIL 5 MG: 5 TABLET ORAL at 10:21

## 2018-04-01 RX ADMIN — PANTOPRAZOLE SODIUM 40 MG: 40 TABLET, DELAYED RELEASE ORAL at 08:39

## 2018-04-01 RX ADMIN — HYDROCODONE BITARTRATE AND ACETAMINOPHEN 1 TABLET: 7.5; 325 TABLET ORAL at 20:52

## 2018-04-01 RX ADMIN — HYDROCODONE BITARTRATE AND ACETAMINOPHEN 1 TABLET: 7.5; 325 TABLET ORAL at 12:48

## 2018-04-01 NOTE — PROGRESS NOTES
[facility] -  Progress Note     LOS: 2 days   Patient Care Team:  Kassi Acevedo MD as PCP - General (General Practice)  Saw Godinez MD as Consulting Physician (Urology)    Chief Complaint: SSCP    Subjective  - INFERIOR STEMI    Interval History: subsequent thrombotic occlusion of fem-fem bypass requiring emergent thrombectomy by Dr Alexis    Patient Complaints: lots of nausea yesterday but much better today. Is oob ok and up to toilet. No cp or leg pain or sob. Discussed nicotine wd and she is ok and does not want Nicotine patch. Is having a recurrence of her chronic intermittent left sided and subscapular pain that has been determined in the past to be M-S in orign        Review of Systems:  Review of Systems   Constitutional: Negative for fatigue, fever and unexpected weight change.   Respiratory: Negative for apnea, chest tightness and shortness of breath.    Cardiovascular: Negative for chest pain, palpitations and leg swelling.   Gastrointestinal: Positive for nausea. Negative for abdominal pain.   Genitourinary: Negative for dysuria.   Musculoskeletal: Positive for back pain. Negative for myalgias.   Neurological: Negative for weakness and light-headedness.   Psychiatric/Behavioral: Negative for sleep disturbance.           Vital Sign Min/Max for last 24 hours  Temp  Min: 98.1 °F (36.7 °C)  Max: 99.2 °F (37.3 °C)   BP  Min: 117/75  Max: 149/72   Pulse  Min: 65  Max: 89   Resp  Min: 16  Max: 22   SpO2  Min: 91 %  Max: 98 %   Flow (L/min)  Min: 1.5  Max: 3   No Data Recorded     1    03/30/18  0746   Weight: 70 kg (154 lb 6.4 oz)       Physical Exam:   Physical Exam   Constitutional: No distress.   Eyes: Pupils are equal, round, and reactive to light.   Neck:   Diminished bs bilat   Pulmonary/Chest: Effort normal. She has no wheezes. She has no rales. She exhibits no tenderness.   Abdominal: Soft. Bowel sounds are normal. She exhibits no distension and no mass. There is no  tenderness.   Musculoskeletal: She exhibits no edema, tenderness or deformity.   Neurological: She is alert and oriented to person, place, and time.   Skin: Skin is warm and dry.           Results Review:   Lab Results (last 24 hours)     ** No results found for the last 24 hours. **              Echo EF Estimated  No results found for: ECHOEFEST      Cath Ejection Fraction Quantitative  65%      Medication Review: yes  Current Facility-Administered Medications   Medication Dose Route Frequency Provider Last Rate Last Dose   • acetaminophen (TYLENOL) tablet 650 mg  650 mg Oral Q4H PRN Evangelista Coley MD   650 mg at 03/30/18 0933   • ALPRAZolam (XANAX) tablet 0.5 mg  0.5 mg Oral Nightly PRN Nicolás Alexis DO   0.5 mg at 03/31/18 2143   • amLODIPine (NORVASC) tablet 5 mg  5 mg Oral Daily Evangelista Coley MD   5 mg at 04/01/18 0840   • aspirin EC tablet 81 mg  81 mg Oral Daily Sharath Malcolm MD   81 mg at 04/01/18 0840   • atorvastatin (LIPITOR) tablet 40 mg  40 mg Oral Nightly Evangelista Coley MD   40 mg at 03/31/18 2139   • carvedilol (COREG) tablet 12.5 mg  12.5 mg Oral BID With Meals Evangelista Coley MD       • diphenhydrAMINE (BENADRYL) capsule 25 mg  25 mg Oral Q6H PRN Evangelista Coley MD       • enalaprilat (VASOTEC) injection 1.25 mg  1.25 mg Intravenous Q6H PRN Sharath Malcolm MD   1.25 mg at 03/30/18 2156   • HYDROcodone-acetaminophen (NORCO) 5-325 MG per tablet 1 tablet  1 tablet Oral Q4H PRN Nicolás Alexis DO       • HYDROcodone-acetaminophen (NORCO) 7.5-325 MG per tablet 1 tablet  1 tablet Oral 4x Daily Nicolás Alexis DO   1 tablet at 04/01/18 0839   • lidocaine (LIDODERM) 5 % 1 patch  1 patch Transdermal Q24H Sharath Malcolm MD   1 patch at 03/31/18 1836   • LORazepam (ATIVAN) tablet 1 mg  1 mg Oral Q6H PRN Evangelista Coley MD   1 mg at 03/30/18 2137   • magnesium hydroxide (MILK OF MAGNESIA) suspension 2400 mg/10mL 10 mL  10 mL Oral Daily PRN Evangelista Jenkins  MD Brijesh       • Morphine sulfate (PF) injection 2 mg  2 mg Intravenous Q4H PRN Evangelista Coley MD   2 mg at 03/30/18 1500    And   • naloxone (NARCAN) injection 0.4 mg  0.4 mg Intravenous Q5 Min PRN Evangelista Coley MD       • Morphine sulfate (PF) injection 4 mg  4 mg Intravenous Q4H PRN Nicolás Alexis DO   4 mg at 03/31/18 2353    And   • naloxone (NARCAN) injection 0.4 mg  0.4 mg Intravenous Q5 Min PRN Nicolás Alexis DO       • niCARdipine (CARDENE) 25 mg/250 mL (0.1 mg/mL) 0.9% NS infusion  5-15 mg/hr Intravenous Titrated Sharath Malcolm MD   Stopped at 03/31/18 0945   • ondansetron (ZOFRAN) injection 4 mg  4 mg Intravenous Q4H PRN Sharath Malcolm MD   4 mg at 03/31/18 2143    Or   • ondansetron (ZOFRAN) tablet 4 mg  4 mg Oral Q6H PRN Sharath Malcolm MD        Or   • ondansetron ODT (ZOFRAN-ODT) disintegrating tablet 4 mg  4 mg Oral Q6H PRN Sharath Malcolm MD       • pantoprazole (PROTONIX) EC tablet 40 mg  40 mg Oral QAM AC Nicolás Alexis DO   40 mg at 04/01/18 0839   • promethazine (PHENERGAN) injection 12.5 mg  12.5 mg Intravenous Q6H PRN Sharath Malcolm MD   12.5 mg at 03/31/18 1606   • senna-docusate (PERICOLACE) 8.6-50 MG per tablet 1 tablet  1 tablet Oral BID PRN Nicolás Alexis DO       • sennosides-docusate sodium (SENOKOT-S) 8.6-50 MG tablet 2 tablet  2 tablet Oral Nightly Evangelista Coley MD   2 tablet at 03/31/18 2138   • sodium chloride 0.9 % flush 1-10 mL  1-10 mL Intravenous PRN Nicolás Alexis DO       • sodium chloride 0.9 % flush 10 mL  10 mL Intravenous PRN Evelin Fischer MD       • sodium chloride 0.9 % infusion  100 mL/hr Intravenous Continuous Evangelista Coley MD   Stopped at 04/01/18 0732   • ticagrelor (BRILINTA) tablet 90 mg  90 mg Oral BID Evangelista Coley MD   90 mg at 04/01/18 0840         Assessment/Plan    STABLE AFTER IMI AND EMERGENT RCA PCI - transfer to   STABLE AFTER EMERGENT LLE ART GRAFT THROMBECTOMY  HTN - increase Coreg and  start Lisinopril    Active Problems:    ST elevation myocardial infarction involving right coronary artery    PAD (peripheral artery disease)    Chronic bronchitis    Tobacco consumption      DISCUSSED WITH PT AND CCU NURSE    Evangelista Coley MD  04/01/18  9:13 AM
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
## 2018-04-01 NOTE — PLAN OF CARE
Problem: Patient Care Overview  Goal: Plan of Care Review  Outcome: Ongoing (interventions implemented as appropriate)   04/01/18 1512   Coping/Psychosocial   Plan of Care Reviewed With patient   OTHER   Outcome Summary PT eval done. She walked 300 feet with no loss of balance and HR and SpO2 stable. PT not indicated at this time. Advised she walk with nursing or family while here.

## 2018-04-02 VITALS
TEMPERATURE: 97 F | BODY MASS INDEX: 24.75 KG/M2 | HEIGHT: 61 IN | RESPIRATION RATE: 18 BRPM | DIASTOLIC BLOOD PRESSURE: 47 MMHG | HEART RATE: 64 BPM | WEIGHT: 131.06 LBS | SYSTOLIC BLOOD PRESSURE: 127 MMHG | OXYGEN SATURATION: 98 %

## 2018-04-02 PROCEDURE — 99239 HOSP IP/OBS DSCHRG MGMT >30: CPT | Performed by: INTERNAL MEDICINE

## 2018-04-02 RX ORDER — GUAIFENESIN 600 MG/1
1200 TABLET, EXTENDED RELEASE ORAL EVERY 12 HOURS SCHEDULED
Start: 2018-04-02

## 2018-04-02 RX ORDER — CARVEDILOL 12.5 MG/1
12.5 TABLET ORAL 2 TIMES DAILY WITH MEALS
Qty: 60 TABLET | Refills: 11 | Status: SHIPPED | OUTPATIENT
Start: 2018-04-02

## 2018-04-02 RX ORDER — ATORVASTATIN CALCIUM 40 MG/1
40 TABLET, FILM COATED ORAL NIGHTLY
Qty: 30 TABLET | Refills: 11 | Status: SHIPPED | OUTPATIENT
Start: 2018-04-02 | End: 2018-08-24

## 2018-04-02 RX ORDER — GUAIFENESIN 600 MG/1
1200 TABLET, EXTENDED RELEASE ORAL EVERY 12 HOURS SCHEDULED
Status: DISCONTINUED | OUTPATIENT
Start: 2018-04-02 | End: 2018-04-02 | Stop reason: HOSPADM

## 2018-04-02 RX ORDER — LISINOPRIL 5 MG/1
5 TABLET ORAL
Qty: 30 TABLET | Refills: 11 | Status: SHIPPED | OUTPATIENT
Start: 2018-04-03 | End: 2018-04-16

## 2018-04-02 RX ORDER — ASPIRIN 81 MG/1
81 TABLET ORAL DAILY
Start: 2018-04-03

## 2018-04-02 RX ORDER — AMLODIPINE BESYLATE 5 MG/1
5 TABLET ORAL DAILY
Qty: 30 TABLET | Refills: 11 | Status: SHIPPED | OUTPATIENT
Start: 2018-04-03 | End: 2018-04-30 | Stop reason: SDUPTHER

## 2018-04-02 RX ADMIN — AMLODIPINE BESYLATE 5 MG: 5 TABLET ORAL at 08:09

## 2018-04-02 RX ADMIN — PANTOPRAZOLE SODIUM 40 MG: 40 TABLET, DELAYED RELEASE ORAL at 08:09

## 2018-04-02 RX ADMIN — CARVEDILOL 12.5 MG: 6.25 TABLET, FILM COATED ORAL at 08:09

## 2018-04-02 RX ADMIN — LISINOPRIL 5 MG: 5 TABLET ORAL at 08:09

## 2018-04-02 RX ADMIN — HYDROCODONE BITARTRATE AND ACETAMINOPHEN 1 TABLET: 7.5; 325 TABLET ORAL at 08:09

## 2018-04-02 RX ADMIN — HYDROCODONE BITARTRATE AND ACETAMINOPHEN 1 TABLET: 7.5; 325 TABLET ORAL at 11:52

## 2018-04-02 RX ADMIN — ASPIRIN 81 MG: 81 TABLET ORAL at 08:09

## 2018-04-02 RX ADMIN — GUAIFENESIN 1200 MG: 600 TABLET, EXTENDED RELEASE ORAL at 11:52

## 2018-04-02 RX ADMIN — TICAGRELOR 90 MG: 90 TABLET ORAL at 08:09

## 2018-04-02 RX ADMIN — LIDOCAINE 1 PATCH: 50 PATCH TOPICAL at 05:29

## 2018-04-02 NOTE — DISCHARGE INSTRUCTIONS
No heavy lifting for 5-7 days greater than 10 pounds.  No heavy or strenuous pushing or pulling.  No tub baths, hot tubs, swimming pools, or submerging groin underwater for 1 week.  Wash groin site daily with antibacterial soap and water. Rinse and keep clean and dry.  No lotions, powders, or topical ointments to site. Keep open to air and dry.  Call our office with any concerns or if you notice redness, swelling, drainage, warmth, or pain at the site.

## 2018-04-02 NOTE — PROGRESS NOTES
Discharge Planning Assessment  [facility]     Patient Name: Zo Stout  MRN: [id number]  Today's Date: 4/2/2018    Admit Date: 3/30/2018          Discharge Needs Assessment     Row Name 04/02/18 0940       Living Environment    Lives With other relative(s)    Current Living Arrangements home/apartment/condo    Primary Care Provided by self    Provides Primary Care For no one    Family Caregiver if Needed other relative(s)    Quality of Family Relationships helpful    Able to Return to Prior Arrangements yes       Resource/Environmental Concerns    Resource/Environmental Concerns none    Transportation Concerns car, none       Transition Planning    Patient/Family Anticipates Transition to home with family    Patient/Family Anticipated Services at Transition none    Transportation Anticipated family or friend will provide       Discharge Needs Assessment    Readmission Within the Last 30 Days no previous admission in last 30 days    Concerns to be Addressed no discharge needs identified;denies needs/concerns at this time    Equipment Currently Used at Home shower chair    Anticipated Changes Related to Illness none    Equipment Needed After Discharge none    Discharge Facility/Level of Care Needs home with home health    Offered/Gave Vendor List yes            Discharge Plan     Row Name 04/02/18 0923       Plan    Plan PT resides at home with family and plans to dc to the same. PT is request HH services and has selected [facility] from provider list. SW will arrange HH once orders are written. Will follow.     Patient/Family in Agreement with Plan yes        Destination     No service coordination in this encounter.      Durable Medical Equipment     No service coordination in this encounter.      Dialysis/Infusion     No service coordination in this encounter.      Home Medical Care     No service coordination in this encounter.      Social Care     No service coordination in this encounter.                 Demographic Summary    No documentation.           Functional Status    No documentation.           Psychosocial    No documentation.           Abuse/Neglect    No documentation.           Legal    No documentation.           Substance Abuse    No documentation.           Patient Forms    No documentation.         Krista Whitt MSW

## 2018-04-02 NOTE — DISCHARGE SUMMARY
[facility] HEART GROUP DISCHARGE    Date of Discharge:  4/2/2018    Discharge Diagnosis:   ST elevation myocardial infarction involving right coronary artery  Acute ischemic left lower extremity due to occluded femorofemoral bypass graft and left femoropopliteal bypass graft status post thrombectomy with left groin cut down  Native Coronary artery disease  Hypertension  Hyperlipidemia  Peripheral Vascular Disease  Tobacco Abuse    Presenting Problem/History of Present Illness  ST elevation myocardial infarction involving right coronary artery [I21.11]    Hospital Course  Patient is a 77 y.o. female presented to the ED with complaints of substernal chest pain that started at home and radiated to her left arm. Upon arrival she was noted to have inferior ST elevation and was taken emergently to the catherization lab. Coronary angiography revealed in stent thrombosis in her previously placed mid RCA stent. PTCA, intracoronary lytic delivery, and drug eluting stenting were performed. The patient was transferred to the CCU for recovery. She noted to have severe left leg pain post operatively. She has a history of PVD and previous femoral bypass grafting. Dr. Alexis was consulted and took the patient to the operating room. She was found to have thrombotic occlusion of her femorofemoral bypass graft and her femoropopliteal bypass graft. Left leg cut down was performed and successful thrombectomy performed. She did well post procedure. She has been ambulating. No further chest pain or leg pain. She was started on Brilinta.  The patient had some noted hypertension, medication changes were made. She was discharged home on the medications below. She has been instructed on follow up appointments, post care of her groins, medication changes, heart healthy diet and importance of smoking cessation. The patient verbalized understanding and is eager for discharge home today. She will have follow up with cardiology and  vascular surgery as outpatient.     Procedures Performed  Procedure(s):  LEFT LOWER EXTREMITY THROMBECTOMY/EMBOLECTOMY     Consults:   Consults     Date and Time Order Name Status Description    3/30/2018 0946 Inpatient Vascular Surgery Consult Completed           Pertinent Test Results: angiography: cardiac and peripheral imaging  PREOPERATIVE DIAGNOSIS:   1. acutely ischemic left lower extremity   2. occluded femorofemoral bypass graft and left femoropopliteal bypass graft      POSTOPERATIVE DIAGNOSIS: Same     PROCEDURE PERFORMED:   1.  Aortoiliac angiogram with left lower extremity runoff  2.  Left groin cutdown/exposure  3.  Thrombectomy of the femorofemoral bypass graft and left femoropopliteal bypass graft with a #4 Esther catheter  4.  Completion angiogram of the femorofemoral bypass and left lower extremity bypass graft with radio graphic supervision and interpretation.  5.  Minx closure of the right common femoral artery     SURGEON: Nicolás Alexis DO   ANESTHESIA: Mac  PREPARATION: Routine.  STAFF: Circulator: Mer Xiong RN  Scrub Person: Izzy Hopkins; Venita Beasley  Assistant: Goldie Zamudio  Vascular Radiology Technician: Deya Aguilar  ESTIMATED BLOOD LOSS: 75 mL  SPECIMENS: None  COMPLICATIONS: None  INDICATIONS: Zo Stout is a 77 y.o. female who presented to the emergency room with chest pain.  The chest pain started around 5 AM this morning.  She underwent heart catheterization successfully.  After the case she was noted to have an acutely ischemic left lower extremity.  She has an extensive history of multiple vascular surgical procedures in [place].  Therefore the decision was made to proceed the operating room for revascularization. The indications, risks, and possible complications of the procedure were explained to the patient, who voiced understanding and wished to proceed with surgery.     PROCEDURE IN DETAIL: The patient was taken to the operating room and  placed on the operating table in a supine position. After Mac anesthesia was obtained, the bilateral groins and the entire left lower extremity was prepped and draped in a sterile manner.  Through the previously placed 6 [place]ar[place] sheath an aortoiliac angiogram was performed.  Findings are as follows:  1.  Patent right iliac system without significant stenosis.   2.  Patent common femoral, profunda femoris, and proximal SFA without stenosis  3.  Occluded femorofemoral bypass graft as well as occluded left femoropopliteal bypass graft     At this point, the decision was made to open the left groin at the femorofemoral/femoropopliteal anastomosis.  5 mL of 0.5% Marcaine plain was used to infiltrate the skin for local anesthesia.  A transverse incision was then made medial to the left groin overlying this anastomosis.  Careful dissection was made down through the subcutaneous tissues using the Bovie cautery to ensure hemostasis.  The patient had received 5000 units of intravenous heparin.  The bypass grafts were quickly identified.  They were cleared from the local attachments using the Metzenbaum scissors.  Proximal and distal control was established with vessel loops.  The femorofemoral bypass graft appeared to be a vein bypass.  A small transverse arteriotomy was made in the vein bypass graft with 11 blade.  The #4 Esther was passed back proximal to the right groin retrieving a large amount of clot.  Adequate inflow was reestablished.  The #4 Esther was then passed down the femoropopliteal bypass graft multiple times retrieving clot and intimal debris.  The Esther was then passed into the left common femoral artery and out into the profunda femoris artery retrieving a very small amount of clot.  An angiogram was performed on the left leg which showed rapid flow down through the bypass graft and out through the three-vessel runoff to the foot.  The common femoral profunda femoris arteries remained open.  The decision  was made at this point to remove the 6 Chinese sheath and closed the artery with a minx.  Direct pressure was held for an additional 5-10 minutes to help ensure hemostasis.  At this point adequate inflow and outflow was reestablished.  The small arteriotomy was closed with a 5-0 Prolene in a running fashion.  Pulses were checked at the ankle which were dopplerable.  The wound bed was irrigated with antibiotic saline and hemostasis was observed.  The deep layers were closed with a 3-0 Vicryl in a running fashion.  The skin was then reapproximated using a 4-0 Monocryl in a subcuticular fashion.  The wound was then cleaned.  Sterile dressings were applied. The patient tolerated the procedure well. Sponge and needle counts were correct. The patient was then awakened in the operating room and taken to the recovery room in good condition.  Nicolás Alexis DO  Date: 3/30/2018          Time: 11:56 AM    Left Heart Catherization Conclusion     Date of procedure: 3/30/2018  Procedures performed:  1. Selective coronary angiography  2. Left heart catheterization  3. Left ventriculograhy  4. Percutaneous coronary intervention to the mid RCA  5. IC Lytic therapy     Indication: Inferior STEMI  Premedication: Versed, Fentanyl  Site: Right femoral  Contrast: Isovue 370 - 100 ml to patient  Radiation: Flouro time= 5:20. Air Kerma= 395  Catheters: 5Fr JL4, 5Fr Angled Pigtail.  Guiding catheter: 6Fr JR4 with pp  PCI Hardware: .014 PT Choice guide wire, 2.5X20 Trek balloon, 2.75X20 Trek NC balloon,  2.75X28 Xience Alpine drug eluting stent    Procedural details:  The patient was brought to the cath lab and prepped and draped in the usual fashion. A Seldinger technique was used to place a 6 Fr sheath in the right femoral artery. Catheter exchanges were made over a guide-wire through the sheath under fluoroscopic guidance. A 5 Fr Pigtail catheter was used to cross the aortic valve for ventriculography and pressure measurements on  either side of the valve. That was exchanged for a 5 Fr JL4 catheter that was engaged in the ostium of the Left Main coronary artery for left system angiography. That was exchanged for a 6 Fr JR4 catheter which was engaged in the ostium of the Right coronary artery for right system angiography. We then proceeded with PCI of the thrombosed RCA.  The catheter and sheath were then removed and the femoral sheath was secured for removal later with manual compression. There were no obvious complications and the patient was transferred to the CCU in hemodynamically stable condition     CONSCIOUS SEDATION OBSERVATION:  I observed the nursing staff administer the prescribed sedatives and confirmed adequate patient  relaxation and hemodynamics and oxygen saturation. I was face to face with the pt making these observations for 29 minutes.     Findings:  Hemodynamics:  Ek=966/50, JI=484/18, AV grad=nil  Left ventriculography:  1. The aortic and mitral valves appear normal. The aortic outflow tract is normal.  2. The left ventricle is normal in wall thickness and chamber size.  3. The contractility pattern of the LV shows mild inferior wall and moderate postero-basal hypokinesis  4.  LVEF=65     Selective coronary angiography:   Dominance: Right  Left Main coronary artery: clear   Left anterior descending artery: free of disease. One large Diagonal branch.  Ramus intermedius: larger than any OM and clear   Left circumflex:  Small system that is clear   Right coronary artery:  Dominant vessel with stent material in the proximal to mid vessel. There is thrombotic total occlusion of the mid RCA inside the stent      Percutaneous coronary intervention: The guide was positioned in the ostium of the Right  Coronary artery. The guidewire was advanced beyond the stenosis under fluoroscopic guidance. The balloon was advanced to the lesion and inflated at 23qT47l. A bolus of Aggrastat was then given into the artery through the guide. The  stent was then advanced to the lesion and deployed at 03pJ50p. The NC balloon was then inflated in the stent at 24vY69f. The residual stenosis is 0% and outflow is LYNSEY 3. The catheter was then removed and the sheath was secured and will be removed later.        Impression:  1. INFERIOR STEMI DUE TO ACUTE THROMBOTIC OCCLUSION OF THE MID RCA  2. SUCCESSFUL PCI OF THE RCA WITH PTCA, IC LYTIC AND RE-STENTING  3. NO LEFT SYSTEM DISEASE  4. MINIMAL LV SYSTOLIC DYSFUNCTION     Plan: TO THE CCU FOR ROUTINE POST STEMI / PCI CARE     Evangelista Coley MD     Condition on Discharge:  Stable    Physical Exam at Discharge  General: alert and oriented, NAD, ambulatory without difficulty  Card: RRR, no mrg  Resp: Clear breath sounds with intermittent productive cough  Extremities: no swelling, palpable pulses  Procedure sites: right groin: soft without hematoma  Left pubis area: transverse incision with steri strips, clean, dry, and intact    Vital Signs  Temp:  [96.9 °F (36.1 °C)-97.4 °F (36.3 °C)] 97 °F (36.1 °C)  Heart Rate:  [58-67] 64  Resp:  [18] 18  BP: (106-127)/(47-61) 127/47      Discharge Disposition  Home or Self Care    Discharge Medications   Zo Stout   Home Medication Instructions BERTHA:[id number]    Printed on:04/02/18 1154   Medication Information                      ALPRAZolam (XANAX) 0.5 MG tablet  Take 0.5 mg by mouth Daily As Needed for Anxiety.             amLODIPine (NORVASC) 5 MG tablet  Take 1 tablet by mouth Daily.             aspirin 81 MG EC tablet  Take 1 tablet by mouth Daily.             atorvastatin (LIPITOR) 40 MG tablet  Take 1 tablet by mouth Every Night.             carvedilol (COREG) 12.5 MG tablet  Take 1 tablet by mouth 2 (Two) Times a Day With Meals.             guaiFENesin (MUCINEX) 600 MG 12 hr tablet  Take 2 tablets by mouth Every 12 (Twelve) Hours.             HYDROcodone-acetaminophen (NORCO)  MG per tablet  Take 1 tablet by mouth 2 (Two) Times a Day As Needed for  Moderate Pain .             lisinopril (PRINIVIL,ZESTRIL) 5 MG tablet  Take 1 tablet by mouth Daily.             pantoprazole (PROTONIX) 40 MG EC tablet  Take 40 mg by mouth Every Morning Before Breakfast.             senna-docusate (PERICOLACE) 8.6-50 MG per tablet  Take 1 tablet by mouth As Needed for Constipation.             ticagrelor (BRILINTA) 90 MG tablet tablet  Take 1 tablet by mouth 2 (Two) Times a Day.                 Discharge Diet:   Heart Health Diet: No added salt    Activity at Discharge:   No heavy lifting for 5-7 days greater than 10 pounds.  No heavy or strenuous pushing or pulling.  No tub baths, hot tubs, swimming pools, or submerging groin underwater for 1 week.  Wash groin site daily with antibacterial soap and water. Rinse and keep clean and dry.  No lotions, powders, or topical ointments to site. Keep open to air and dry.  Call our office with any concerns or if you notice redness, swelling, drainage, warmth, or pain at the site.    Follow-up Appointments  PCP in 1 week  Future Appointments  Date Time Provider Department Center   4/16/2018 11:45 AM RUPERT Munoz VS P[facility] None   4/30/2018 3:00 PM PAD HEART GROUP NP2 [facility] CD PAD None   7/31/2018 11:15 AM MD THELMA Crockett CD PAD None     Additional Instructions for the Follow-ups that You Need to Schedule     Ambulatory Referral to Cardiac Rehab    As directed               RUPERT Archuleta  04/02/18  11:51 AM    Time:approximately 40 minutes

## 2018-04-02 NOTE — PLAN OF CARE
Problem: Patient Care Overview  Goal: Plan of Care Review  Outcome: Ongoing (interventions implemented as appropriate)   03/31/18 1757 04/01/18 1914 04/02/18 0407   Coping/Psychosocial   Plan of Care Reviewed With --  patient --    Plan of Care Review   Progress improving --  --    OTHER   Outcome Summary --  --  VSS. Pain medication given per order with good result for chronic back pain. Pt walked with staff in the hallway. Will continue to monitor and notify Md of changes.     Goal: Individualization and Mutuality  Outcome: Ongoing (interventions implemented as appropriate)      Problem: Cardiac: ACS (Acute Coronary Syndrome) (Adult)  Goal: Signs and Symptoms of Listed Potential Problems Will be Absent, Minimized or Managed (Cardiac: ACS)  Outcome: Ongoing (interventions implemented as appropriate)      Problem: Pain, Acute (Adult)  Goal: Identify Related Risk Factors and Signs and Symptoms  Outcome: Ongoing (interventions implemented as appropriate)    Goal: Acceptable Pain Control/Comfort Level  Outcome: Ongoing (interventions implemented as appropriate)      Problem: Fall Risk (Adult)  Goal: Identify Related Risk Factors and Signs and Symptoms  Outcome: Ongoing (interventions implemented as appropriate)    Goal: Absence of Fall  Outcome: Ongoing (interventions implemented as appropriate)      Problem: Skin Injury Risk (Adult)  Goal: Identify Related Risk Factors and Signs and Symptoms  Outcome: Outcome(s) achieved Date Met: 04/02/18    Goal: Skin Health and Integrity  Outcome: Ongoing (interventions implemented as appropriate)

## 2018-04-04 DIAGNOSIS — R53.1 GENERALIZED WEAKNESS: Primary | ICD-10-CM

## 2018-04-04 NOTE — PROGRESS NOTES
PT was dc without requested HH orders. PT has called asking about HH  SW has advised that PT should be able to get HH orders from her PCP. SW added that upon dc, PT had orders entered for cardiac rehab, but this was not to begin until a few weeks after dc. PT stated that she would call her PCP to get orders. Krista Whitt MSW

## 2018-04-09 ENCOUNTER — TELEPHONE (OUTPATIENT)
Dept: VASCULAR SURGERY | Facility: CLINIC | Age: 78
End: 2018-04-09

## 2018-04-09 ENCOUNTER — TELEPHONE (OUTPATIENT)
Dept: CARDIOLOGY | Facility: CLINIC | Age: 78
End: 2018-04-09

## 2018-04-09 NOTE — TELEPHONE ENCOUNTER
Patient states she has been very weak since the heart attack. She wanting to know if this is normal? Please advise.

## 2018-04-09 NOTE — TELEPHONE ENCOUNTER
Spoke with patient to notify. Patient then states she does not want to go to [facility] for rehab anymore, she would like to come to [facility]. Spoke with Sherin (scheduling) about this matter, she was able to change order and send to hospital scheduling she advised me to tell patient she should hear something back in the next couple of weeks and if she does not then to give us a call.

## 2018-04-09 NOTE — TELEPHONE ENCOUNTER
Patient called concerning weakness and recent MI.Explained this was vascular office and asked if she intended to call Cardiologist.Stated she meant to call cardiology office.Has number for heart group.

## 2018-04-13 ENCOUNTER — TELEPHONE (OUTPATIENT)
Dept: VASCULAR SURGERY | Facility: CLINIC | Age: 78
End: 2018-04-13

## 2018-04-16 ENCOUNTER — OFFICE VISIT (OUTPATIENT)
Dept: VASCULAR SURGERY | Facility: CLINIC | Age: 78
End: 2018-04-16

## 2018-04-16 VITALS
DIASTOLIC BLOOD PRESSURE: 70 MMHG | SYSTOLIC BLOOD PRESSURE: 100 MMHG | OXYGEN SATURATION: 97 % | BODY MASS INDEX: 23.92 KG/M2 | HEIGHT: 62 IN | WEIGHT: 130 LBS | HEART RATE: 77 BPM

## 2018-04-16 DIAGNOSIS — I73.9 PAD (PERIPHERAL ARTERY DISEASE) (HCC): Primary | ICD-10-CM

## 2018-04-16 DIAGNOSIS — Z72.0 TOBACCO ABUSE: ICD-10-CM

## 2018-04-16 PROCEDURE — 99024 POSTOP FOLLOW-UP VISIT: CPT | Performed by: NURSE PRACTITIONER

## 2018-04-16 RX ORDER — QUETIAPINE FUMARATE 50 MG/1
50 TABLET, FILM COATED ORAL NIGHTLY
COMMUNITY
End: 2020-03-05

## 2018-04-16 RX ORDER — GABAPENTIN 600 MG/1
600 TABLET ORAL 2 TIMES DAILY
COMMUNITY

## 2018-04-16 NOTE — PATIENT INSTRUCTIONS
Steps to Quit Smoking  Smoking tobacco can be harmful to your health and can affect almost every organ in your body. Smoking puts you, and those around you, at risk for developing many serious chronic diseases. Quitting smoking is difficult, but it is one of the best things that you can do for your health. It is never too late to quit.  What are the benefits of quitting smoking?  When you quit smoking, you lower your risk of developing serious diseases and conditions, such as:  · Lung cancer or lung disease, such as COPD.  · Heart disease.  · Stroke.  · Heart attack.  · Infertility.  · Osteoporosis and bone fractures.  Additionally, symptoms such as coughing, wheezing, and shortness of breath may get better when you quit. You may also find that you get sick less often because your body is stronger at fighting off colds and infections. If you are pregnant, quitting smoking can help to reduce your chances of having a baby of low birth weight.  How do I get ready to quit?  When you decide to quit smoking, create a plan to make sure that you are successful. Before you quit:  · Pick a date to quit. Set a date within the next two weeks to give you time to prepare.  · Write down the reasons why you are quitting. Keep this list in places where you will see it often, such as on your bathroom mirror or in your car or wallet.  · Identify the people, places, things, and activities that make you want to smoke (triggers) and avoid them. Make sure to take these actions:  ¨ Throw away all cigarettes at home, at work, and in your car.  ¨ Throw away smoking accessories, such as ashtrays and lighters.  ¨ Clean your car and make sure to empty the ashtray.  ¨ Clean your home, including curtains and carpets.  · Tell your family, friends, and coworkers that you are quitting. Support from your loved ones can make quitting easier.  · Talk with your health care provider about your options for quitting smoking.  · Find out what treatment  options are covered by your health insurance.  What strategies can I use to quit smoking?  Talk with your healthcare provider about different strategies to quit smoking. Some strategies include:  · Quitting smoking altogether instead of gradually lessening how much you smoke over a period of time. Research shows that quitting “cold turkey” is more successful than gradually quitting.  · Attending in-person counseling to help you build problem-solving skills. You are more likely to have success in quitting if you attend several counseling sessions. Even short sessions of 10 minutes can be effective.  · Finding resources and support systems that can help you to quit smoking and remain smoke-free after you quit. These resources are most helpful when you use them often. They can include:  ¨ Online chats with a counselor.  ¨ Telephone quitlines.  ¨ Printed self-help materials.  ¨ Support groups or group counseling.  ¨ Text messaging programs.  ¨ Mobile phone applications.  · Taking medicines to help you quit smoking. (If you are pregnant or breastfeeding, talk with your health care provider first.) Some medicines contain nicotine and some do not. Both types of medicines help with cravings, but the medicines that include nicotine help to relieve withdrawal symptoms. Your health care provider may recommend:  ¨ Nicotine patches, gum, or lozenges.  ¨ Nicotine inhalers or sprays.  ¨ Non-nicotine medicine that is taken by mouth.  Talk with your health care provider about combining strategies, such as taking medicines while you are also receiving in-person counseling. Using these two strategies together makes you more likely to succeed in quitting than if you used either strategy on its own.  If you are pregnant or breastfeeding, talk with your health care provider about finding counseling or other support strategies to quit smoking. Do not take medicine to help you quit smoking unless told to do so by your health care  provider.  What things can I do to make it easier to quit?  Quitting smoking might feel overwhelming at first, but there is a lot that you can do to make it easier. Take these important actions:  · Reach out to your family and friends and ask that they support and encourage you during this time. Call telephone quitlines, reach out to support groups, or work with a counselor for support.  · Ask people who smoke to avoid smoking around you.  · Avoid places that trigger you to smoke, such as bars, parties, or smoke-break areas at work.  · Spend time around people who do not smoke.  · Lessen stress in your life, because stress can be a smoking trigger for some people. To lessen stress, try:  ¨ Exercising regularly.  ¨ Deep-breathing exercises.  ¨ Yoga.  ¨ Meditating.  ¨ Performing a body scan. This involves closing your eyes, scanning your body from head to toe, and noticing which parts of your body are particularly tense. Purposefully relax the muscles in those areas.  · Download or purchase mobile phone or tablet apps (applications) that can help you stick to your quit plan by providing reminders, tips, and encouragement. There are many free apps, such as QuitGuide from the CDC (Centers for Disease Control and Prevention). You can find other support for quitting smoking (smoking cessation) through smokefree.gov and other websites.  How will I feel when I quit smoking?  Within the first 24 hours of quitting smoking, you may start to feel some withdrawal symptoms. These symptoms are usually most noticeable 2-3 days after quitting, but they usually do not last beyond 2-3 weeks. Changes or symptoms that you might experience include:  · Mood swings.  · Restlessness, anxiety, or irritation.  · Difficulty concentrating.  · Dizziness.  · Strong cravings for sugary foods in addition to nicotine.  · Mild weight gain.  · Constipation.  · Nausea.  · Coughing or a sore throat.  · Changes in how your medicines work in your  body.  · A depressed mood.  · Difficulty sleeping (insomnia).  After the first 2-3 weeks of quitting, you may start to notice more positive results, such as:  · Improved sense of smell and taste.  · Decreased coughing and sore throat.  · Slower heart rate.  · Lower blood pressure.  · Clearer skin.  · The ability to breathe more easily.  · Fewer sick days.  Quitting smoking is very challenging for most people. Do not get discouraged if you are not successful the first time. Some people need to make many attempts to quit before they achieve long-term success. Do your best to stick to your quit plan, and talk with your health care provider if you have any questions or concerns.  This information is not intended to replace advice given to you by your health care provider. Make sure you discuss any questions you have with your health care provider.  Document Released: 12/12/2002 Document Revised: 08/15/2017 Document Reviewed: 05/03/2016  [email or web address] Interactive Patient Education © 2017 [employer]

## 2018-04-16 NOTE — PROGRESS NOTES
04/16/2018      Evangelista Coley MD  [address] [facility] [address]  [facility] [address]  [place] [address]    Zo Stout  1940    Chief Complaint   Patient presents with   • Follow-up     2 Week Post-Op Follow UP. Patient states no stroke like symptoms.        Dear Evangelista Coley,*:      HPI  I had the pleasure of seeing your patient Zo Stout in the office today.  Thank you kindly for this consultation.  As you recall, Zo Stout is a 77 y.o.  female who was seen in the hospital with complaints of chest pain.  She underwent heart catheterization.  After the case was noted to have an acutely ischemic left lower extremity.  She has had multiple vascular interventions by physician in [place].  Dr. Alexis did take her to surgery that day for thrombectomy of the fem-fem bypass graft and her left femoropopliteal bypass.  She is doing well since the procedure.  Her left groin incision has healed.  She is inquiring about transferring care to Dr. Alexis.    Past Medical History:   Diagnosis Date   • Chronic bronchitis    • COPD (chronic obstructive pulmonary disease)    • Coronary artery disease    • History of endovascular stent graft for abdominal aortic aneurysm (AAA) 01/2013   • Myocardial infarction    • PAD (peripheral artery disease)    • Seizures    • Tobacco use    • Vascular disorder of extremity        Past Surgical History:   Procedure Laterality Date   • CARDIAC CATHETERIZATION N/A 3/30/2018    Procedure: Left Heart Cath;  Surgeon: Evangelista Coley MD;  Location:  [facility] CATH [facility]IVE [facility];  Service: Cardiology   • CARDIAC SURGERY     • HYSTERECTOMY     • LEG THROMBECTOMY/EMBOLECTOMY Left 3/30/2018    Procedure: LEFT LOWER EXTREMITY THROMBECTOMY/EMBOLECTOMY;  Surgeon: Nicolás Alexis DO;  Location:  [facility] OR;  Service: Vascular   • URETER SURGERY     • VASCULAR SURGERY      AAA Stent, multiple vascular stents and repairs       Family History   Problem Relation Age of Onset   • Heart  disease Other        Social History     Social History   • Marital status:      Spouse name: N/A   • Number of children: N/A   • Years of education: N/A     Occupational History   • Not on file.     Social History Main Topics   • Smoking status: Current Every Day Smoker     Packs/day: 0.50     Types: Cigarettes   • Smokeless tobacco: Never Used   • Alcohol use No   • Drug use: No   • Sexual activity: Defer     Other Topics Concern   • Not on file     Social History Narrative   • No narrative on file       Allergies   Allergen Reactions   • Bee Venom Anaphylaxis   • Carisoprodol Shortness Of Breath, Unknown (See Comments) and Anaphylaxis   • Cefaclor Anaphylaxis   • Levaquin [Levofloxacin] Anaphylaxis   • Metoprolol Anaphylaxis and Shortness Of Breath   • Phenytoin Anaphylaxis   • Quinolones Anaphylaxis   • Dilantin [Phenytoin Sodium Extended]    • Lexiscan [Regadenoson]    • Naproxen Swelling   • Phenobarbital Swelling   • Phenytoin Sodium Extended Itching   • Propoxyphene Unknown (See Comments)   • Toprol Xl [Metoprolol Tartrate]    • Ibuprofen Swelling and Rash       Prior to Admission medications    Medication Sig Start Date End Date Taking? Authorizing Provider   ALPRAZolam (XANAX) 0.5 MG tablet Take 0.5 mg by mouth Daily As Needed for Anxiety.   Yes Historical Provider, MD   amLODIPine (NORVASC) 5 MG tablet Take 1 tablet by mouth Daily. 4/3/18  Yes RUPERT Archuleta   aspirin 81 MG EC tablet Take 1 tablet by mouth Daily. 4/3/18  Yes RUPERT Archuleta   atorvastatin (LIPITOR) 40 MG tablet Take 1 tablet by mouth Every Night. 4/2/18  Yes RUPERT Archuleta   carvedilol (COREG) 12.5 MG tablet Take 1 tablet by mouth 2 (Two) Times a Day With Meals. 4/2/18  Yes RUPERT Archuleta   gabapentin (NEURONTIN) 300 MG capsule Take 300 mg by mouth 2 (Two) Times a Day.   Yes Historical Provider, MD   guaiFENesin (MUCINEX) 600 MG 12 hr tablet Take 2 tablets by mouth Every 12 (Twelve) Hours. 4/2/18  Yes Georgina  "RUPERT Rios   HYDROcodone-acetaminophen (NORCO)  MG per tablet Take 1 tablet by mouth 2 (Two) Times a Day As Needed for Moderate Pain .   Yes Historical Provider, MD   pantoprazole (PROTONIX) 40 MG EC tablet Take 40 mg by mouth Every Morning Before Breakfast. 2/27/18  Yes Historical Provider, MD   QUEtiapine (SEROquel) 50 MG tablet Take 50 mg by mouth Every Night.   Yes Historical Provider, MD   senna-docusate (PERICOLACE) 8.6-50 MG per tablet Take 1 tablet by mouth As Needed for Constipation.   Yes Historical Provider, MD   ticagrelor (BRILINTA) 90 MG tablet tablet Take 1 tablet by mouth 2 (Two) Times a Day. 4/2/18  Yes RUPERT Archuleta   lisinopril (PRINIVIL,ZESTRIL) 5 MG tablet Take 1 tablet by mouth Daily. 4/3/18 4/16/18  RUPERT Archuleta       Review of Systems   HENT: Negative.    Eyes: Negative.    Respiratory: Positive for shortness of breath.    Cardiovascular: Negative.    Gastrointestinal: Negative.    Endocrine: Negative.    Genitourinary: Negative.    Musculoskeletal: Negative.    Skin: Negative.    Allergic/Immunologic: Negative.    Neurological: Positive for dizziness and weakness.   Hematological: Negative.    Psychiatric/Behavioral: Negative.        /70   Pulse 77   Ht 156.2 cm (61.5\")   Wt 59 kg (130 lb)   SpO2 97%   BMI 24.17 kg/m²   Physical Exam   Constitutional: She is oriented to person, place, and time. She appears well-developed and well-nourished. No distress.   HENT:   Head: Normocephalic and atraumatic.   Mouth/Throat: Oropharynx is clear and moist.   Eyes: Pupils are equal, round, and reactive to light. No scleral icterus.   Neck: Normal range of motion. Neck supple. No JVD present. Carotid bruit is not present. No thyromegaly present.   Cardiovascular: Normal rate, regular rhythm, S2 normal, normal heart sounds, intact distal pulses and normal pulses.  Exam reveals no gallop and no friction rub.    No murmur heard.  Doppler signals   Pulmonary/Chest: Effort " normal. She has decreased breath sounds.   Abdominal: Soft. Normal aorta and bowel sounds are normal. There is no hepatosplenomegaly.   Musculoskeletal: Normal range of motion.   Neurological: She is alert and oriented to person, place, and time. No cranial nerve deficit.   Skin: Skin is warm and dry. She is not diaphoretic.   Psychiatric: She has a normal mood and affect. Her behavior is normal. Judgment and thought content normal.   Nursing note and vitals reviewed.      Ir Angiogram Extremity Bilateral    Result Date: 4/9/2018  Narrative: Done in surgery.  Refer to patient's medical record. This report was finalized on 04/09/2018 14:21 by Dr. Nicolás Alexis MD.    Xr Chest 1 View    Result Date: 3/30/2018  Narrative: EXAMINATION:  XR CHEST 1 VW-  3/30/2018 7:46 AM CDT  HISTORY: Chest pain.  COMPARISON: 4/29/2017.  FINDINGS:  Coarse markings and bronchial wall thickening remain stable. There is no dense infiltrate or effusion. Heart size is upper limits of normal. Old granulomatous disease is noted.      Impression: 1. No new infiltrate. Heart size upper limits of normal. 2. Coarse markings and bronchial wall thickening, stable. Old granulomatous disease.   This report was finalized on 03/30/2018 08:18 by Dr. Bay Palomo MD.    Fl C Arm During Surgery    Result Date: 4/9/2018  Narrative: Done in surgery.  Refer to patient's medical record. This report was finalized on 04/09/2018 14:21 by Dr. Nicolás Alexis MD.      Patient Active Problem List   Diagnosis   • ST elevation myocardial infarction involving right coronary artery   • PAD (peripheral artery disease)   • Chronic bronchitis   • Tobacco consumption   • Tobacco use         ICD-10-CM ICD-9-CM   1. PAD (peripheral artery disease) I73.9 443.9   2. Tobacco abuse Z72.0 305.1           Plan: After thoroughly evaluating Zo Stout, I believe the best course of action is to remian conservative from a vascular standpoint.  She is scheduled to follow with  her Dr. In [place] however would like to transfer here as her son lives in [place].  We will need to get records from her previous physician.  She did sign  To allow this.  As soon as records are obtained, we will order appropriate testing for follow up in August as this is when she is scheduled.    She needs to continue on her aspirin and brilinta as well as lipitor.  I did discuss vascular risk factors as they pertain to the progression of vascular disease including controlling her smoking cessation.  I advised the patient of the risks in continuing to use tobacco, and I provided this patient with smoking cessation educational materials.  During this visit, I spent > 3-10 minutes counseling the patient regarding smoking cessation.  Body mass index is 24.17 kg/m².   No follow up required.  The patient can continue taking her current medication regimen as previously planned.  This was all discussed in full with complete understanding.    Thank you for allowing me to participate in the care of your patient.  Please do not hesitate with any questions or concerns.  I will keep you aware of any further encounters with Zo Stout.        Sincerely yours,         RUPERT Munoz MD

## 2018-04-30 ENCOUNTER — OFFICE VISIT (OUTPATIENT)
Dept: CARDIOLOGY | Facility: CLINIC | Age: 78
End: 2018-04-30

## 2018-04-30 VITALS
HEIGHT: 61 IN | HEART RATE: 62 BPM | SYSTOLIC BLOOD PRESSURE: 90 MMHG | WEIGHT: 136 LBS | DIASTOLIC BLOOD PRESSURE: 60 MMHG | BODY MASS INDEX: 25.68 KG/M2 | RESPIRATION RATE: 18 BRPM

## 2018-04-30 DIAGNOSIS — I25.10 CORONARY ARTERY DISEASE INVOLVING NATIVE CORONARY ARTERY OF NATIVE HEART, ANGINA PRESENCE UNSPECIFIED: Primary | ICD-10-CM

## 2018-04-30 DIAGNOSIS — I21.11 STEMI INVOLVING RIGHT CORONARY ARTERY (HCC): ICD-10-CM

## 2018-04-30 DIAGNOSIS — R06.02 SHORTNESS OF BREATH: ICD-10-CM

## 2018-04-30 PROCEDURE — 99214 OFFICE O/P EST MOD 30 MIN: CPT | Performed by: NURSE PRACTITIONER

## 2018-04-30 PROCEDURE — 99406 BEHAV CHNG SMOKING 3-10 MIN: CPT | Performed by: NURSE PRACTITIONER

## 2018-04-30 PROCEDURE — 93000 ELECTROCARDIOGRAM COMPLETE: CPT | Performed by: NURSE PRACTITIONER

## 2018-04-30 RX ORDER — LOSARTAN POTASSIUM 50 MG/1
50 TABLET ORAL DAILY
COMMUNITY
End: 2019-03-01

## 2018-04-30 RX ORDER — AMLODIPINE BESYLATE 2.5 MG/1
5 TABLET ORAL DAILY
Start: 2018-04-30 | End: 2019-03-01

## 2018-04-30 NOTE — PROGRESS NOTES
"    Subjective:     Encounter Date:04/30/2018      Patient ID: Zo Stout is a 77 y.o. female.    Chief Complaint:  The patient reports she is feeling poorly overall. Her primary compliant is weakness, fatigue, and dizziness associated with low blood pressures. Home blood pressure readings reviewed- SBP is ranging from 70s-110s. She admits some shortness of breath, but also admits she is a smoker and has been recovering from bronchitis. She has chronic BLE edema that she states is unchanged. She denies chest pain or syncope. She states she had one episode of pressure in her upper back 2 weeks ago that was non exertional and lasted about 20 minutes. It was similar to her previous angina. She has had no recurrence since that time. She states she feels too weak to start cardiac rehab right now but she is doing PT/OT with home health and tolerating this well.         The following portions of the patient's history were reviewed and updated as appropriate: allergies, current medications, past family history, past medical history, past social history, past surgical history and problem list.  BP 90/60 (BP Location: Right arm, Patient Position: Sitting, Cuff Size: Adult)   Pulse 62   Resp 18   Ht 154.9 cm (61\")   Wt 61.7 kg (136 lb)   Breastfeeding? No   BMI 25.70 kg/m²   Allergies   Allergen Reactions   • Bee Venom Anaphylaxis   • Carisoprodol Shortness Of Breath, Unknown (See Comments) and Anaphylaxis   • Cefaclor Anaphylaxis   • Levaquin [Levofloxacin] Anaphylaxis   • Metoprolol Anaphylaxis and Shortness Of Breath   • Phenytoin Anaphylaxis   • Quinolones Anaphylaxis   • Dilantin [Phenytoin Sodium Extended]    • Lexiscan [Regadenoson]    • Naproxen Swelling   • Phenobarbital Swelling   • Phenytoin Sodium Extended Itching   • Propoxyphene Unknown (See Comments)   • Toprol Xl [Metoprolol Tartrate]    • Ibuprofen Swelling and Rash       Current Outpatient Prescriptions:   •  ALPRAZolam (XANAX) 0.5 MG tablet, Take " 0.5 mg by mouth Daily As Needed for Anxiety., Disp: , Rfl:   •  amLODIPine (NORVASC) 2.5 MG tablet, Take 2 tablets by mouth Daily., Disp: , Rfl:   •  aspirin 81 MG EC tablet, Take 1 tablet by mouth Daily., Disp: , Rfl:   •  atorvastatin (LIPITOR) 40 MG tablet, Take 1 tablet by mouth Every Night., Disp: 30 tablet, Rfl: 11  •  carvedilol (COREG) 12.5 MG tablet, Take 1 tablet by mouth 2 (Two) Times a Day With Meals., Disp: 60 tablet, Rfl: 11  •  gabapentin (NEURONTIN) 300 MG capsule, Take 300 mg by mouth 2 (Two) Times a Day., Disp: , Rfl:   •  guaiFENesin (MUCINEX) 600 MG 12 hr tablet, Take 2 tablets by mouth Every 12 (Twelve) Hours., Disp: , Rfl:   •  HYDROcodone-acetaminophen (NORCO)  MG per tablet, Take 1 tablet by mouth 2 (Two) Times a Day As Needed for Moderate Pain ., Disp: , Rfl:   •  losartan (COZAAR) 50 MG tablet, Take 50 mg by mouth Daily., Disp: , Rfl:   •  pantoprazole (PROTONIX) 40 MG EC tablet, Take 40 mg by mouth Every Morning Before Breakfast., Disp: , Rfl:   •  QUEtiapine (SEROquel) 50 MG tablet, Take 50 mg by mouth Every Night., Disp: , Rfl:   •  senna-docusate (PERICOLACE) 8.6-50 MG per tablet, Take 1 tablet by mouth As Needed for Constipation., Disp: , Rfl:   •  ticagrelor (BRILINTA) 90 MG tablet tablet, Take 1 tablet by mouth 2 (Two) Times a Day., Disp: 60 tablet, Rfl: 11  Past Medical History:   Diagnosis Date   • Chronic bronchitis    • COPD (chronic obstructive pulmonary disease)    • Coronary artery disease    • History of endovascular stent graft for abdominal aortic aneurysm (AAA) 01/2013   • Myocardial infarction    • PAD (peripheral artery disease)    • Seizures    • Tobacco use    • Vascular disorder of extremity      Past Surgical History:   Procedure Laterality Date   • CARDIAC CATHETERIZATION N/A 3/30/2018    Procedure: Left Heart Cath;  Surgeon: Evangelista Coley MD;  Location:  [facility] CATH [facility]VAS[facility];  Service: Cardiology   • CARDIAC SURGERY     • HYSTERECTOMY     •  LEG THROMBECTOMY/EMBOLECTOMY Left 3/30/2018    Procedure: LEFT LOWER EXTREMITY THROMBECTOMY/EMBOLECTOMY;  Surgeon: Nicolás Alexis DO;  Location: [facility] OR;  Service: Vascular   • URETER SURGERY     • VASCULAR SURGERY      AAA Stent, multiple vascular stents and repairs     Social History     Social History   • Marital status:      Spouse name: N/A   • Number of children: N/A   • Years of education: N/A     Occupational History   • Not on file.     Social History Main Topics   • Smoking status: Current Every Day Smoker     Packs/day: 0.50     Types: Cigarettes   • Smokeless tobacco: Never Used   • Alcohol use No   • Drug use: No   • Sexual activity: Defer     Other Topics Concern   • Not on file     Social History Narrative   • No narrative on file     Family History   Problem Relation Age of Onset   • Heart disease Other    • Cancer Mother    • Alzheimer's disease Mother    • Heart disease Father        Review of Systems   Constitution: Positive for weakness and malaise/fatigue. Negative for chills, diaphoresis and fever.   HENT: Negative for nosebleeds.    Eyes: Negative for visual disturbance.   Cardiovascular: Positive for leg swelling (chronic ). Negative for chest pain, claudication, cyanosis, dyspnea on exertion, irregular heartbeat, near-syncope, orthopnea, palpitations, paroxysmal nocturnal dyspnea and syncope.   Respiratory: Positive for shortness of breath. Negative for cough, hemoptysis, sputum production and wheezing.    Hematologic/Lymphatic: Negative for bleeding problem.   Skin: Negative for color change and flushing.   Musculoskeletal: Positive for back pain. Negative for falls.   Gastrointestinal: Negative for bloating, abdominal pain, hematemesis, hematochezia, melena, nausea and vomiting.   Genitourinary: Negative for hematuria.   Neurological: Positive for dizziness and light-headedness.   Psychiatric/Behavioral: Negative for altered mental status.         ECG 12 Lead  Date/Time:  4/30/2018 3:56 PM  Performed by: WHITNEY ALVARADO  Authorized by: WHITNEY ALVARADO   Comparison: compared with previous ECG from 3/31/2018  Similar to previous ECG  Comparison to previous ECG: T wave inversions- inferior, V3-V6, PVC   Rhythm: sinus rhythm  Ectopy: infrequent PVCs  Conduction: incomplete RBBB               Objective:     Physical Exam   Constitutional: She is oriented to person, place, and time. She appears well-developed and well-nourished. No distress.   HENT:   Head: Normocephalic and atraumatic.   Eyes: Pupils are equal, round, and reactive to light.   Neck: Normal range of motion. Neck supple. No JVD present. No thyromegaly present.   Cardiovascular: Normal rate, regular rhythm, normal heart sounds and intact distal pulses.  Exam reveals no gallop and no friction rub.    No murmur heard.  Pulmonary/Chest: Effort normal. No respiratory distress. She has decreased breath sounds. She has wheezes (few scattered expiratory wheezes ). She has no rales. She exhibits no tenderness.   Abdominal: Soft. Bowel sounds are normal. She exhibits no distension. There is no tenderness.   Musculoskeletal: Normal range of motion. She exhibits edema (1-2+ BLE edema ).   Neurological: She is alert and oriented to person, place, and time. No cranial nerve deficit.   Skin: Skin is warm and dry. She is not diaphoretic.   Psychiatric: She has a normal mood and affect. Her behavior is normal.       Lab Review:     I advised the patient of the risks in continuing to use tobacco, and I provided this patient with smoking cessation educational materials.    During this visit, I spent 4 minutes counseling the patient regarding smoking cessation.       Assessment:          Diagnosis Plan   1. Coronary artery disease involving native coronary artery of native heart, angina presence unspecified  Stable. One episode of angina 2 weeks ago. None since. Refer for cardiac rehab  Check 2d echo due to recent symptoms- see HPI               2.  STEMI involving right coronary artery  PCI to RCA 3/30/18    3. Hypotension - reduce antihypertensives ; also counseled on the antihypertensive effects of her pain, anxiety, and neuropathy medications- pt to discuss these with PCP   4. Tobacco abuse- counseled on the importance and benefits of cessation- see above  5. Peripheral arterial disease - s/p LLL graft thrombectomy for ischemic LLE following cath 3/30. She reports she follows with a vascular surgeon in [facility] and has 2 aortic aneurysm stents and has had multiple lower extremity revascularization procedures         Plan:       Reduce Norvasc to 2.5 mg daily and losartan to 25 mg daily   2d echo   Continue ASA, brilinta, statin, coreg, PRN NTG  Follow up 2 weeks

## 2018-05-11 ENCOUNTER — HOSPITAL ENCOUNTER (OUTPATIENT)
Dept: CARDIOLOGY | Facility: HOSPITAL | Age: 78
Discharge: HOME | End: 2018-05-11
Admitting: NURSE PRACTITIONER

## 2018-05-11 VITALS
DIASTOLIC BLOOD PRESSURE: 60 MMHG | WEIGHT: 136 LBS | HEIGHT: 61 IN | BODY MASS INDEX: 25.68 KG/M2 | SYSTOLIC BLOOD PRESSURE: 90 MMHG

## 2018-05-11 DIAGNOSIS — I25.10 CORONARY ARTERY DISEASE INVOLVING NATIVE CORONARY ARTERY OF NATIVE HEART, ANGINA PRESENCE UNSPECIFIED: ICD-10-CM

## 2018-05-11 DIAGNOSIS — R06.02 SHORTNESS OF BREATH: ICD-10-CM

## 2018-05-11 LAB
BH CV ECHO MEAS - AO MAX PG (FULL): 0.31 MMHG
BH CV ECHO MEAS - AO MAX PG: 6.9 MMHG
BH CV ECHO MEAS - AO MEAN PG (FULL): 0 MMHG
BH CV ECHO MEAS - AO MEAN PG: 3 MMHG
BH CV ECHO MEAS - AO ROOT AREA (BSA CORRECTED): 1.4
BH CV ECHO MEAS - AO ROOT AREA: 4 CM^2
BH CV ECHO MEAS - AO ROOT DIAM: 2.3 CM
BH CV ECHO MEAS - AO V2 MAX: 131 CM/SEC
BH CV ECHO MEAS - AO V2 MEAN: 79.1 CM/SEC
BH CV ECHO MEAS - AO V2 VTI: 33.5 CM
BH CV ECHO MEAS - AVA(I,A): 2.5 CM^2
BH CV ECHO MEAS - AVA(I,D): 2.5 CM^2
BH CV ECHO MEAS - AVA(V,A): 2.8 CM^2
BH CV ECHO MEAS - AVA(V,D): 2.8 CM^2
BH CV ECHO MEAS - BSA(HAYCOCK): 1.6 M^2
BH CV ECHO MEAS - BSA: 1.6 M^2
BH CV ECHO MEAS - BZI_BMI: 25.7 KILOGRAMS/M^2
BH CV ECHO MEAS - BZI_METRIC_HEIGHT: 154.9 CM
BH CV ECHO MEAS - BZI_METRIC_WEIGHT: 61.7 KG
BH CV ECHO MEAS - CONTRAST EF 4CH: 68.3 ML/M^2
BH CV ECHO MEAS - EDV(CUBED): 110.6 ML
BH CV ECHO MEAS - EDV(MOD-SP4): 75 ML
BH CV ECHO MEAS - EDV(TEICH): 107.5 ML
BH CV ECHO MEAS - EF(CUBED): 75.3 %
BH CV ECHO MEAS - EF(MOD-SP4): 68.3 %
BH CV ECHO MEAS - EF(TEICH): 67.2 %
BH CV ECHO MEAS - ESV(CUBED): 27.3 ML
BH CV ECHO MEAS - ESV(MOD-SP4): 23.8 ML
BH CV ECHO MEAS - ESV(TEICH): 35.3 ML
BH CV ECHO MEAS - FS: 37.3 %
BH CV ECHO MEAS - IVS/LVPW: 0.84
BH CV ECHO MEAS - IVSD: 0.9 CM
BH CV ECHO MEAS - LA DIMENSION: 3.2 CM
BH CV ECHO MEAS - LA/AO: 1.4
BH CV ECHO MEAS - LAT PEAK E' VEL: 7.9 CM/SEC
BH CV ECHO MEAS - LV DIASTOLIC VOL/BSA (35-75): 46.8 ML/M^2
BH CV ECHO MEAS - LV MASS(C)D: 165 GRAMS
BH CV ECHO MEAS - LV MASS(C)DI: 102.9 GRAMS/M^2
BH CV ECHO MEAS - LV MAX PG: 6.6 MMHG
BH CV ECHO MEAS - LV MEAN PG: 3 MMHG
BH CV ECHO MEAS - LV SYSTOLIC VOL/BSA (12-30): 14.8 ML/M^2
BH CV ECHO MEAS - LV V1 MAX: 128 CM/SEC
BH CV ECHO MEAS - LV V1 MEAN: 74.5 CM/SEC
BH CV ECHO MEAS - LV V1 VTI: 30 CM
BH CV ECHO MEAS - LVIDD: 4.8 CM
BH CV ECHO MEAS - LVIDS: 3 CM
BH CV ECHO MEAS - LVLD AP4: 6.7 CM
BH CV ECHO MEAS - LVLS AP4: 5 CM
BH CV ECHO MEAS - LVOT AREA (M): 2.8 CM^2
BH CV ECHO MEAS - LVOT AREA: 2.8 CM^2
BH CV ECHO MEAS - LVOT DIAM: 1.9 CM
BH CV ECHO MEAS - LVPWD: 1.1 CM
BH CV ECHO MEAS - MED PEAK E' VEL: 7.29 CM/SEC
BH CV ECHO MEAS - MR ALIAS VEL: 38.5 CM/SEC
BH CV ECHO MEAS - MR ERO: 0.05 CM^2
BH CV ECHO MEAS - MR FLOW RATE: 21.8 CM^3/SEC
BH CV ECHO MEAS - MR MAX PG: 77.8 MMHG
BH CV ECHO MEAS - MR MAX VEL: 441 CM/SEC
BH CV ECHO MEAS - MR MEAN PG: 53 MMHG
BH CV ECHO MEAS - MR MEAN VEL: 335 CM/SEC
BH CV ECHO MEAS - MR PISA RADIUS: 0.3 CM
BH CV ECHO MEAS - MR PISA: 0.57 CM^2
BH CV ECHO MEAS - MR VOLUME: 8 ML
BH CV ECHO MEAS - MR VTI: 163 CM
BH CV ECHO MEAS - MV A MAX VEL: 70.8 CM/SEC
BH CV ECHO MEAS - MV DEC TIME: 0.24 SEC
BH CV ECHO MEAS - MV E MAX VEL: 95.1 CM/SEC
BH CV ECHO MEAS - MV E/A: 1.3
BH CV ECHO MEAS - RAP SYSTOLE: 5 MMHG
BH CV ECHO MEAS - RVDD: 3.1 CM
BH CV ECHO MEAS - RVSP: 28.6 MMHG
BH CV ECHO MEAS - SI(AO): 83.1 ML/M^2
BH CV ECHO MEAS - SI(CUBED): 52 ML/M^2
BH CV ECHO MEAS - SI(LVOT): 53 ML/M^2
BH CV ECHO MEAS - SI(MOD-SP4): 31.9 ML/M^2
BH CV ECHO MEAS - SI(TEICH): 45 ML/M^2
BH CV ECHO MEAS - SV(AO): 133.2 ML
BH CV ECHO MEAS - SV(CUBED): 83.3 ML
BH CV ECHO MEAS - SV(LVOT): 85.1 ML
BH CV ECHO MEAS - SV(MOD-SP4): 51.2 ML
BH CV ECHO MEAS - SV(TEICH): 72.2 ML
BH CV ECHO MEAS - TR MAX VEL: 243 CM/SEC
BH CV ECHO MEASUREMENTS AVERAGE E/E' RATIO: 12.52
LEFT ATRIUM VOLUME INDEX: 32.1 ML/M2
LEFT ATRIUM VOLUME: 51.4 CM3

## 2018-05-11 PROCEDURE — 93306 TTE W/DOPPLER COMPLETE: CPT | Performed by: INTERNAL MEDICINE

## 2018-05-11 PROCEDURE — 93306 TTE W/DOPPLER COMPLETE: CPT

## 2018-05-14 ENCOUNTER — TELEPHONE (OUTPATIENT)
Dept: CARDIOLOGY | Facility: CLINIC | Age: 78
End: 2018-05-14

## 2018-05-14 NOTE — TELEPHONE ENCOUNTER
Just CARLO:      Tamera @[facility] has called in today to report patient is having some weakness, swelling/bloating all over, mild pitting edema, SOB, and on Sunday patient had 4 sharp pains under left breast. She was wondering if we could get patient in tomorrow after 4:00 due to patients family members having appointments, I explained to her that NP quit seeing patients at 3:30 and her Thursday appointment would be the earliest we would be able to see her. Tamera expressed that she understood.

## 2018-05-14 NOTE — TELEPHONE ENCOUNTER
----- Message from RUPERT Riggins sent at 5/14/2018 11:47 AM CDT -----  Please notify the patient her echo shows a normal LVEF/pumping function of her heart. Thanks.     ----- Message -----  From: Evangelista Coley MD  Sent: 5/11/2018   1:25 PM  To: RUPERT Riggins

## 2018-05-17 ENCOUNTER — OFFICE VISIT (OUTPATIENT)
Dept: CARDIOLOGY | Facility: CLINIC | Age: 78
End: 2018-05-17

## 2018-05-17 VITALS
WEIGHT: 133 LBS | HEIGHT: 61 IN | OXYGEN SATURATION: 98 % | BODY MASS INDEX: 25.11 KG/M2 | HEART RATE: 62 BPM | DIASTOLIC BLOOD PRESSURE: 66 MMHG | SYSTOLIC BLOOD PRESSURE: 120 MMHG

## 2018-05-17 DIAGNOSIS — E78.2 MIXED HYPERLIPIDEMIA: ICD-10-CM

## 2018-05-17 DIAGNOSIS — I25.10 CORONARY ARTERY DISEASE INVOLVING NATIVE CORONARY ARTERY OF NATIVE HEART WITHOUT ANGINA PECTORIS: ICD-10-CM

## 2018-05-17 DIAGNOSIS — I10 ESSENTIAL HYPERTENSION: ICD-10-CM

## 2018-05-17 DIAGNOSIS — I21.11 ST ELEVATION MYOCARDIAL INFARCTION INVOLVING RIGHT CORONARY ARTERY (HCC): Primary | ICD-10-CM

## 2018-05-17 DIAGNOSIS — R53.1 WEAKNESS: ICD-10-CM

## 2018-05-17 DIAGNOSIS — I73.9 PERIPHERAL VASCULAR DISEASE (HCC): ICD-10-CM

## 2018-05-17 DIAGNOSIS — Z72.0 TOBACCO ABUSE: ICD-10-CM

## 2018-05-17 PROCEDURE — 99214 OFFICE O/P EST MOD 30 MIN: CPT | Performed by: NURSE PRACTITIONER

## 2018-05-17 PROCEDURE — 93000 ELECTROCARDIOGRAM COMPLETE: CPT | Performed by: NURSE PRACTITIONER

## 2018-05-17 RX ORDER — NITROGLYCERIN 0.4 MG/1
0.4 TABLET SUBLINGUAL
COMMUNITY

## 2018-05-17 NOTE — PATIENT INSTRUCTIONS

## 2018-05-17 NOTE — PROGRESS NOTES
Subjective:     Encounter Date:05/17/2018      Patient ID: Zo Stout is a 77 y.o. female with a history of STEMI involving right coronary artery last month, peripheral vascular disease, native coronary artery disease, hyperlipidemia, hypertension, and tobacco abuse.  She was recently seen in office for a discharge follow up and medication changes were made to her blood pressure medicines.  She presents today for a 2 week follow up.    Chief Complaint: 2 week follow up  History of Present Illness    CAD: no chest pain, pressure, or similar. No shortness of breath. Denies palpitations, dizziness, syncope, or pre syncope.  She notes fatigue. Tolerating Brilinta and aspirin.      PVD: she notes lower leg weakness.  She denies leg pain or claudication. She denies lower extremity edema.    HTN: blood pressure is usually well controlled.  She has had issues with orthostatic hypotension for some time, however this is not the case of recent. She has had a few instances of high blood pressure.  She denies headaches, syncope, or falls.    Fatigue: related to lower leg weakness. Tires easily with walking.    The following portions of the patient's history were reviewed and updated as appropriate: allergies, current medications, past family history, past medical history, past social history and past surgical history.     Allergies   Allergen Reactions   • Bee Venom Anaphylaxis   • Carisoprodol Shortness Of Breath, Unknown (See Comments) and Anaphylaxis   • Cefaclor Anaphylaxis   • Levaquin [Levofloxacin] Anaphylaxis   • Metoprolol Anaphylaxis and Shortness Of Breath   • Phenytoin Anaphylaxis   • Quinolones Anaphylaxis   • Dilantin [Phenytoin Sodium Extended]    • Lexiscan [Regadenoson]    • Naproxen Swelling   • Phenobarbital Swelling   • Phenytoin Sodium Extended Itching   • Propoxyphene Unknown (See Comments)   • Toprol Xl [Metoprolol Tartrate]    • Ibuprofen Swelling and Rash       Current Outpatient Prescriptions:    •  ALPRAZolam (XANAX) 0.5 MG tablet, Take 0.5 mg by mouth Daily As Needed for Anxiety., Disp: , Rfl:   •  amLODIPine (NORVASC) 2.5 MG tablet, Take 2 tablets by mouth Daily. (Patient taking differently: Take 2.5 mg by mouth Daily. Patient is taking 1/2 tablet daily. Holding if SBP <100), Disp: , Rfl:   •  aspirin 81 MG EC tablet, Take 1 tablet by mouth Daily., Disp: , Rfl:   •  atorvastatin (LIPITOR) 40 MG tablet, Take 1 tablet by mouth Every Night., Disp: 30 tablet, Rfl: 11  •  carvedilol (COREG) 12.5 MG tablet, Take 1 tablet by mouth 2 (Two) Times a Day With Meals., Disp: 60 tablet, Rfl: 11  •  gabapentin (NEURONTIN) 300 MG capsule, Take 300 mg by mouth 2 (Two) Times a Day., Disp: , Rfl:   •  guaiFENesin (MUCINEX) 600 MG 12 hr tablet, Take 2 tablets by mouth Every 12 (Twelve) Hours., Disp: , Rfl:   •  HYDROcodone-acetaminophen (NORCO)  MG per tablet, Take 1 tablet by mouth 2 (Two) Times a Day As Needed for Moderate Pain ., Disp: , Rfl:   •  losartan (COZAAR) 50 MG tablet, Take 50 mg by mouth Daily., Disp: , Rfl:   •  nitroglycerin (NITROSTAT) 0.4 MG SL tablet, Place 0.4 mg under the tongue Every 5 (Five) Minutes As Needed for Chest Pain. Take no more than 3 doses in 15 minutes., Disp: , Rfl:   •  pantoprazole (PROTONIX) 40 MG EC tablet, Take 40 mg by mouth Every Morning Before Breakfast., Disp: , Rfl:   •  QUEtiapine (SEROquel) 50 MG tablet, Take 50 mg by mouth Every Night., Disp: , Rfl:   •  senna-docusate (PERICOLACE) 8.6-50 MG per tablet, Take 1 tablet by mouth As Needed for Constipation., Disp: , Rfl:   •  ticagrelor (BRILINTA) 90 MG tablet tablet, Take 1 tablet by mouth 2 (Two) Times a Day., Disp: 60 tablet, Rfl: 11    Past Medical History:   Diagnosis Date   • Chronic bronchitis    • COPD (chronic obstructive pulmonary disease)    • Coronary artery disease    • History of endovascular stent graft for abdominal aortic aneurysm (AAA) 01/2013   • Myocardial infarction    • PAD (peripheral artery disease)     • Seizures    • Tobacco use    • Vascular disorder of extremity      Family History   Problem Relation Age of Onset   • Heart disease Other    • Cancer Mother    • Alzheimer's disease Mother    • Heart disease Father      Social History     Social History   • Marital status:      Spouse name: N/A   • Number of children: N/A   • Years of education: N/A     Occupational History   • Not on file.     Social History Main Topics   • Smoking status: Current Every Day Smoker     Packs/day: 0.50     Types: Cigarettes   • Smokeless tobacco: Never Used   • Alcohol use No   • Drug use: No   • Sexual activity: Defer     Other Topics Concern   • Not on file     Social History Narrative   • No narrative on file     Past Surgical History:   Procedure Laterality Date   • CARDIAC CATHETERIZATION N/A 3/30/2018    Procedure: Left Heart Cath;  Surgeon: Evangelista Coley MD;  Location:  [facility]ATH [facility];  Service: Cardiology   • CARDIAC SURGERY     • CORONARY STENT PLACEMENT     • HYSTERECTOMY     • LEG THROMBECTOMY/EMBOLECTOMY Left 3/30/2018    Procedure: LEFT LOWER EXTREMITY THROMBECTOMY/EMBOLECTOMY;  Surgeon: Nicolás Alexis DO;  Location:  [facility] OR;  Service: Vascular   • URETER SURGERY     • VASCULAR SURGERY      AAA Stent, multiple vascular stents and repairs     Review of Systems   Constitution: Positive for malaise/fatigue. Negative for chills, diaphoresis and fever.   HENT: Negative for nosebleeds.    Cardiovascular: Negative for chest pain, claudication, dyspnea on exertion, irregular heartbeat, leg swelling, orthopnea, palpitations, paroxysmal nocturnal dyspnea and syncope.   Respiratory: Negative for shortness of breath and wheezing.    Hematologic/Lymphatic: Negative for bleeding problem. Bruises/bleeds easily.   Skin: Negative for flushing, itching and poor wound healing.   Musculoskeletal: Positive for muscle weakness. Negative for falls.   Gastrointestinal: Negative for bloating, abdominal  pain, nausea and vomiting.   Neurological: Negative for dizziness, headaches, light-headedness and vertigo.   Psychiatric/Behavioral: Negative for altered mental status. The patient is not nervous/anxious.    All other systems reviewed and are negative.      ECG 12 Lead  Date/Time: 5/17/2018 2:25 PM  Performed by: MARSHAL GIBBONS  Authorized by: MARSHAL GIBBONS   Comparison: compared with previous ECG from 4/30/2018  Similar to previous ECG  Rhythm: sinus rhythm  Rate: normal  BPM: 62  Conduction: right bundle branch block  ST Segments: ST segments normal  T depression: III, aVF, aVR and II  QRS axis: right  Clinical impression: abnormal ECG          Vitals:    05/17/18 0915   BP: 120/66   Pulse: 62   SpO2: 98%     1    05/17/18 0915   Weight: 60.3 kg (133 lb)          Objective:     Physical Exam   Constitutional: She is oriented to person, place, and time. She appears well-developed and well-nourished. No distress.   HENT:   Head: Normocephalic and atraumatic.   Mouth/Throat: Oropharynx is clear and moist. No oropharyngeal exudate.   Eyes: Conjunctivae are normal. No scleral icterus.   Neck: Normal range of motion. Neck supple.   Cardiovascular: Normal rate and regular rhythm.  Exam reveals no gallop and no friction rub.    No murmur heard.  Pulmonary/Chest: Effort normal and breath sounds normal. No respiratory distress. She has no wheezes.   Abdominal: Soft. Normal appearance. She exhibits no distension. There is no tenderness.   Musculoskeletal: Normal range of motion. She exhibits no edema.   Neurological: She is alert and oriented to person, place, and time.   Skin: Skin is warm, dry and intact. No rash noted. She is not diaphoretic. No erythema. No pallor.   Psychiatric: She has a normal mood and affect. Her behavior is normal.   Vitals reviewed.    Lab Review:       Assessment:          Diagnosis Plan   1. ST elevation myocardial infarction involving right coronary artery     2. Coronary artery disease  involving native coronary artery of native heart without angina pectoris     3. Weakness     4. Peripheral vascular disease     5. Essential hypertension     6. Mixed hyperlipidemia     7. Tobacco abuse            Plan:       - STEMI: April 2018    - CAD: recent stents and MI. On Brilinta and aspirin daily. She denies chest pain, pressure, or similar. She notes that she has had fatigue since discharge and this has not improved. She has had home health since discharge she is getting ready to start cardiac rehab in the next three weeks or so.     - Weakness: this could be related to her PVD or the statin.  The patient has been instructed that she can hold her statin for 2 weeks to determine if her symptoms are related to the statin therapy. If her weakness significantly improves, she may benefit from a pcsk9 rather than a statin for lipid management.  She is following up with vascular to make sure this is not related to her long time history of PVD.  Encouraged her to continue plans for cardiac rehab as this should help improve her strength.    - PVD: was following in [place] and is transitioning to local care with Dr. Alexis    - HTN: was instructed to decreased her norvasc to half and her losartan to half doses at previous follow up.  After discussion with her she notes that she was only taking her prescribed 2.5 mg 2 tablets daily, as one tablet daily and has since started taking 1/2 tablet daily (1.25 mg) and the patient did not change her dosing of losartan as she did not realize that was changed and has continued to take 50 mg daily.  She denies having low blood pressures of recent. Does note that she occasionally has high BP.  She will continue to take her medications as she has been and to monitor her blood pressure at home.    -HLD: monitored per PCP. Currently on statin therapy with Lipitor.    - tobacco abuse: continues to smoke.  Has decreased some, smoking ~ 0.5 pack per day. Counseled on smoking  cessation, especially given her current health issues and history of atherosclerotic disease.    RTC: keep follow up with Dr. Coley as scheduled.

## 2018-05-18 ENCOUNTER — OFFICE VISIT (OUTPATIENT)
Dept: VASCULAR SURGERY | Facility: CLINIC | Age: 78
End: 2018-05-18

## 2018-05-18 VITALS
HEART RATE: 58 BPM | WEIGHT: 133 LBS | HEIGHT: 62 IN | DIASTOLIC BLOOD PRESSURE: 86 MMHG | BODY MASS INDEX: 24.48 KG/M2 | SYSTOLIC BLOOD PRESSURE: 128 MMHG

## 2018-05-18 DIAGNOSIS — I71.40 ABDOMINAL AORTIC ANEURYSM (AAA) WITHOUT RUPTURE (HCC): ICD-10-CM

## 2018-05-18 DIAGNOSIS — I65.23 BILATERAL CAROTID ARTERY STENOSIS: ICD-10-CM

## 2018-05-18 DIAGNOSIS — Z72.0 TOBACCO ABUSE: ICD-10-CM

## 2018-05-18 DIAGNOSIS — Z95.828 S/P FEMORAL-FEMORAL BYPASS SURGERY: Primary | ICD-10-CM

## 2018-05-18 DIAGNOSIS — I73.9 PAD (PERIPHERAL ARTERY DISEASE) (HCC): ICD-10-CM

## 2018-05-18 PROCEDURE — 99024 POSTOP FOLLOW-UP VISIT: CPT | Performed by: NURSE PRACTITIONER

## 2018-05-18 NOTE — PROGRESS NOTES
"05/18/2018       Kassi Acevedo MD  [id number] [place] [address]    Zo Stout  1940    Chief Complaint   Patient presents with   • Follow-up     Complaint of leg pain.Pain from hips to knees when walking distance.Worse on left.       Dear Kassi Acevedo MD:      HPI  I had the pleasure of seeing your patient Zo Stout in the office today.  As you recall, Zo Stout is a 77 y.o.  female who was seen in the hospital with complaints of chest pain.  She underwent heart catheterization.  After the case was noted to have an acutely ischemic left lower extremity.  She has had multiple vascular interventions by physician in [place].  Dr. Alexis did take her to surgery that day for thrombectomy of the fem-fem bypass graft and her left femoropopliteal bypass.  She is doing well since the procedure.  I did review records from [place].  She is having complaints of weakness to her lower extremities, with existing nerve damage to left leg from previous surgeries.  I did review most recent testing in January.  She does have a history of chronic back pain as well.  She has had injections previously.  Unfortunately, she does smoke about 1/2 pack per day.      Review of Systems   HENT: Negative.    Eyes: Negative.    Respiratory: Positive for shortness of breath.    Cardiovascular: Negative.         Leg pain bilaterally, left slightly worse   Gastrointestinal: Negative.    Endocrine: Negative.    Genitourinary: Negative.    Musculoskeletal: Positive for back pain.   Skin: Negative.    Allergic/Immunologic: Negative.    Neurological: Positive for weakness.   Hematological: Negative.    Psychiatric/Behavioral: Negative.        /86   Pulse 58   Ht 156.2 cm (61.5\")   Wt 60.3 kg (133 lb)   BMI 24.72 kg/m²   Physical Exam   Constitutional: She is oriented to person, place, and time. She appears well-developed and well-nourished. No distress.   HENT:   Head: Normocephalic and atraumatic.   Mouth/Throat: " Oropharynx is clear and moist.   Eyes: Pupils are equal, round, and reactive to light. No scleral icterus.   Neck: Normal range of motion. Neck supple. No JVD present. Carotid bruit is not present. No thyromegaly present.   Cardiovascular: Normal rate, regular rhythm, S2 normal, normal heart sounds, intact distal pulses and normal pulses.  Exam reveals no gallop and no friction rub.    No murmur heard.  Pulses:       Femoral pulses are 2+ on the right side, and 2+ on the left side.       Dorsalis pedis pulses are 2+ on the right side, and 2+ on the left side.   Doppler signals PT bilaterally, palp DP   Pulmonary/Chest: Effort normal. She has decreased breath sounds.   Abdominal: Soft. Normal aorta and bowel sounds are normal. There is no hepatosplenomegaly.   Musculoskeletal: Normal range of motion. She exhibits edema (mild bilaterally).   Neurological: She is alert and oriented to person, place, and time. No cranial nerve deficit.   Skin: Skin is warm and dry. She is not diaphoretic.   Psychiatric: She has a normal mood and affect. Her behavior is normal. Judgment and thought content normal.   Nursing note and vitals reviewed.      No results found.    Patient Active Problem List   Diagnosis   • ST elevation myocardial infarction involving right coronary artery   • PAD (peripheral artery disease)   • Chronic bronchitis   • Tobacco consumption   • Tobacco abuse   • Coronary artery disease involving native coronary artery of native heart without angina pectoris   • Weakness   • Peripheral vascular disease   • Essential hypertension   • Mixed hyperlipidemia         ICD-10-CM ICD-9-CM   1. S/P femoral-femoral bypass surgery Z95.828 V45.89   2. PAD (peripheral artery disease) I73.9 443.9   3. Bilateral carotid artery stenosis I65.23 433.10     433.30   4. Abdominal aortic aneurysm (AAA) without rupture I71.4 441.4   5. Tobacco abuse Z72.0 305.1           Plan: After thoroughly evaluating Zo Stout, I believe the  best course of action is to remian conservative from a vascular standpoint.  She has palpable DP bilaterally and doppler PT signals.  Overall, her vascular status appears intact.  I did discuss that I thought her complaints were likely coming from her chronic back pain.  She has not seen anyone regarding in a long time.  I did offer this, however did not want to do at this time.  We will see her back for her regular testing in [place].  She needs to continue on her aspirin and brilinta as well as lipitor.  I did discuss vascular risk factors as they pertain to the progression of vascular disease including controlling her smoking cessation.  I advised the patient of the risks in continuing to use tobacco, and I provided this patient with smoking cessation educational materials.  During this visit, I spent > 3-10 minutes counseling the patient regarding smoking cessation.  Body mass index is 24.72 kg/m².    The patient can continue taking her current medication regimen as previously planned.  This was all discussed in full with complete understanding.    Thank you for allowing me to participate in the care of your patient.  Please do not hesitate with any questions or concerns.  I will keep you aware of any further encounters with Zo Stout.        Sincerely yours,         RUPERT Munoz MD

## 2018-06-01 ENCOUNTER — TRANSCRIBE ORDERS (OUTPATIENT)
Dept: ADMINISTRATIVE | Facility: HOSPITAL | Age: 78
End: 2018-06-01

## 2018-06-01 DIAGNOSIS — R05.3 CHRONIC COUGH: Primary | ICD-10-CM

## 2018-06-06 ENCOUNTER — HOSPITAL ENCOUNTER (OUTPATIENT)
Dept: CT IMAGING | Facility: HOSPITAL | Age: 78
Discharge: HOME | End: 2018-06-06
Admitting: GENERAL PRACTICE

## 2018-06-06 DIAGNOSIS — R05.3 CHRONIC COUGH: ICD-10-CM

## 2018-06-06 PROCEDURE — 71250 CT THORAX DX C-: CPT

## 2018-06-20 ENCOUNTER — HOSPITAL ENCOUNTER (OUTPATIENT)
Dept: HOSPITAL 58 - CAR.REHAB | Age: 78
LOS: 10 days | End: 2018-06-30
Attending: INTERNAL MEDICINE

## 2018-06-20 VITALS — DIASTOLIC BLOOD PRESSURE: 52 MMHG | SYSTOLIC BLOOD PRESSURE: 128 MMHG | TEMPERATURE: 98.9 F

## 2018-06-20 VITALS — BODY MASS INDEX: 25.7 KG/M2

## 2018-06-20 DIAGNOSIS — I21.11: Primary | ICD-10-CM

## 2018-07-02 ENCOUNTER — HOSPITAL ENCOUNTER (OUTPATIENT)
Dept: HOSPITAL 58 - CAR.REHAB | Age: 78
LOS: 15 days | Discharge: HOME | End: 2018-07-17
Attending: INTERNAL MEDICINE
Payer: COMMERCIAL

## 2018-07-02 VITALS — BODY MASS INDEX: 25.7 KG/M2

## 2018-07-02 DIAGNOSIS — I21.11: Primary | ICD-10-CM

## 2018-07-11 ENCOUNTER — TRANSCRIBE ORDERS (OUTPATIENT)
Dept: ADMINISTRATIVE | Facility: HOSPITAL | Age: 78
End: 2018-07-11

## 2018-07-11 DIAGNOSIS — M54.9 OTHER CHRONIC BACK PAIN: Primary | ICD-10-CM

## 2018-07-11 DIAGNOSIS — G89.29 OTHER CHRONIC BACK PAIN: Primary | ICD-10-CM

## 2018-07-13 ENCOUNTER — HOSPITAL ENCOUNTER (OUTPATIENT)
Dept: MRI IMAGING | Facility: HOSPITAL | Age: 78
Discharge: HOME | End: 2018-07-13
Admitting: GENERAL PRACTICE

## 2018-07-13 DIAGNOSIS — G89.29 OTHER CHRONIC BACK PAIN: ICD-10-CM

## 2018-07-13 DIAGNOSIS — M54.9 OTHER CHRONIC BACK PAIN: ICD-10-CM

## 2018-07-13 PROCEDURE — 72148 MRI LUMBAR SPINE W/O DYE: CPT

## 2018-07-31 ENCOUNTER — OFFICE VISIT (OUTPATIENT)
Dept: CARDIOLOGY | Facility: CLINIC | Age: 78
End: 2018-07-31

## 2018-07-31 VITALS
BODY MASS INDEX: 25.68 KG/M2 | WEIGHT: 136 LBS | HEART RATE: 60 BPM | DIASTOLIC BLOOD PRESSURE: 50 MMHG | SYSTOLIC BLOOD PRESSURE: 90 MMHG | HEIGHT: 61 IN

## 2018-07-31 DIAGNOSIS — I10 ESSENTIAL HYPERTENSION: ICD-10-CM

## 2018-07-31 DIAGNOSIS — Z72.0 TOBACCO ABUSE: ICD-10-CM

## 2018-07-31 DIAGNOSIS — I73.9 PAD (PERIPHERAL ARTERY DISEASE) (HCC): Primary | ICD-10-CM

## 2018-07-31 DIAGNOSIS — E78.2 MIXED HYPERLIPIDEMIA: ICD-10-CM

## 2018-07-31 DIAGNOSIS — I25.10 CORONARY ARTERY DISEASE INVOLVING NATIVE CORONARY ARTERY OF NATIVE HEART WITHOUT ANGINA PECTORIS: ICD-10-CM

## 2018-07-31 PROCEDURE — 93000 ELECTROCARDIOGRAM COMPLETE: CPT | Performed by: INTERNAL MEDICINE

## 2018-07-31 PROCEDURE — 99213 OFFICE O/P EST LOW 20 MIN: CPT | Performed by: INTERNAL MEDICINE

## 2018-07-31 NOTE — PROGRESS NOTES
"Subjective    Zo Stout is a 77 y.o. female. FU of ihd and risks    History of Present Illness     IHD:  Has not had cp since her [id number] MIKE 3/3018 but has not regained energy. Is compliant with meds that are well tolerated. Non-compliant with smoking cessation and diet changes. EKG is improved today with less infero-lateral T-inversions. Has lots of anxiousness and nervousness and \"overdoes\" yard work sometimes but does not have cp or unsual sob. Had some le edema till a month ago and that has resolved. LBP limits her activity now and is her biggest issue today.    HLD:  Is compliant with her potent statin but LDL has not been checked since her mi    HTN:  Is compliant with meds and checks at home \"/60-8-\"    TOBACCO USE:  Has been repeatedly warned and educated on cessation techniques and she is not interested in quiting.        The following portions of the patient's history were reviewed and updated as appropriate: allergies, current medications, past family history, past medical history, past social history, past surgical history and problem list.    Patient Active Problem List   Diagnosis   • ST elevation myocardial infarction involving right coronary artery (CMS/HCC)   • PAD (peripheral artery disease) (CMS/HCC)   • Chronic bronchitis (CMS/HCC)   • Tobacco consumption   • Tobacco abuse   • Coronary artery disease involving native coronary artery of native heart without angina pectoris   • Weakness   • Peripheral vascular disease (CMS/HCC)   • Essential hypertension   • Mixed hyperlipidemia       Allergies   Allergen Reactions   • Bee Venom Anaphylaxis   • Carisoprodol Shortness Of Breath, Unknown (See Comments) and Anaphylaxis   • Cefaclor Anaphylaxis   • Levaquin [Levofloxacin] Anaphylaxis   • Metoprolol Anaphylaxis and Shortness Of Breath   • Phenytoin Anaphylaxis   • Quinolones Anaphylaxis   • Dilantin [Phenytoin Sodium Extended]    • Lexiscan [Regadenoson]    • Naproxen Swelling   • " Phenobarbital Swelling   • Phenytoin Sodium Extended Itching   • Propoxyphene Unknown (See Comments)   • Toprol Xl [Metoprolol Tartrate]    • Ibuprofen Swelling and Rash       Family History   Problem Relation Age of Onset   • Heart disease Other    • Cancer Mother    • Alzheimer's disease Mother    • Heart disease Father        Social History     Social History   • Marital status:      Spouse name: N/A   • Number of children: N/A   • Years of education: N/A     Occupational History   • Not on file.     Social History Main Topics   • Smoking status: Current Every Day Smoker     Packs/day: 0.50     Types: Cigarettes   • Smokeless tobacco: Never Used   • Alcohol use No   • Drug use: No   • Sexual activity: Defer     Other Topics Concern   • Not on file     Social History Narrative   • No narrative on file         Current Outpatient Prescriptions:   •  ALPRAZolam (XANAX) 0.5 MG tablet, Take 0.5 mg by mouth Daily As Needed for Anxiety., Disp: , Rfl:   •  amLODIPine (NORVASC) 2.5 MG tablet, Take 2 tablets by mouth Daily. (Patient taking differently: Take 2.5 mg by mouth Daily. Patient is taking 1/2 tablet daily. Holding if SBP <100), Disp: , Rfl:   •  aspirin 81 MG EC tablet, Take 1 tablet by mouth Daily., Disp: , Rfl:   •  atorvastatin (LIPITOR) 40 MG tablet, Take 1 tablet by mouth Every Night., Disp: 30 tablet, Rfl: 11  •  carvedilol (COREG) 12.5 MG tablet, Take 1 tablet by mouth 2 (Two) Times a Day With Meals., Disp: 60 tablet, Rfl: 11  •  gabapentin (NEURONTIN) 300 MG capsule, Take 300 mg by mouth 2 (Two) Times a Day., Disp: , Rfl:   •  guaiFENesin (MUCINEX) 600 MG 12 hr tablet, Take 2 tablets by mouth Every 12 (Twelve) Hours., Disp: , Rfl:   •  HYDROcodone-acetaminophen (NORCO)  MG per tablet, Take 1 tablet by mouth 2 (Two) Times a Day As Needed for Moderate Pain ., Disp: , Rfl:   •  losartan (COZAAR) 50 MG tablet, Take 50 mg by mouth Daily., Disp: , Rfl:   •  nitroglycerin (NITROSTAT) 0.4 MG SL  "tablet, Place 0.4 mg under the tongue Every 5 (Five) Minutes As Needed for Chest Pain. Take no more than 3 doses in 15 minutes., Disp: , Rfl:   •  pantoprazole (PROTONIX) 40 MG EC tablet, Take 40 mg by mouth Every Morning Before Breakfast., Disp: , Rfl:   •  QUEtiapine (SEROquel) 50 MG tablet, Take 50 mg by mouth Every Night., Disp: , Rfl:   •  senna-docusate (PERICOLACE) 8.6-50 MG per tablet, Take 1 tablet by mouth As Needed for Constipation., Disp: , Rfl:   •  ticagrelor (BRILINTA) 90 MG tablet tablet, Take 1 tablet by mouth 2 (Two) Times a Day., Disp: 60 tablet, Rfl: 11    Past Surgical History:   Procedure Laterality Date   • CARDIAC CATHETERIZATION N/A 3/30/2018    Procedure: Left Heart Cath;  Surgeon: Evangelista Coley MD;  Location:  [facility];  Service: Cardiology   • CARDIAC SURGERY     • CORONARY STENT PLACEMENT     • HYSTERECTOMY     • LEG THROMBECTOMY/EMBOLECTOMY Left 3/30/2018    Procedure: LEFT LOWER EXTREMITY THROMBECTOMY/EMBOLECTOMY;  Surgeon: Nicolás Alexis DO;  Location:  [facility] OR;  Service: Vascular   • URETER SURGERY     • VASCULAR SURGERY      AAA Stent, multiple vascular stents and repairs       Review of Systems   Constitutional: Positive for fatigue. Negative for fever and unexpected weight change.   Respiratory: Negative for apnea, chest tightness and shortness of breath.    Cardiovascular: Negative for chest pain, palpitations and leg swelling.   Gastrointestinal: Negative for abdominal pain and blood in stool.   Genitourinary: Negative for dysuria and hematuria.   Musculoskeletal: Positive for back pain. Negative for myalgias.        Lbp is worst problem now   Neurological: Negative for weakness and light-headedness.   Hematological: Bruises/bleeds easily.   Psychiatric/Behavioral: Positive for sleep disturbance. The patient is nervous/anxious.        BP 90/50   Pulse 60   Ht 154.9 cm (61\")   Wt 61.7 kg (136 lb)   BMI 25.70 kg/m²   Procedures    Objective "   Physical Exam   Constitutional: She is oriented to person, place, and time. She appears well-developed and well-nourished.   Eyes: Pupils are equal, round, and reactive to light.   Neck: No thyromegaly present.   Cardiovascular: Normal rate, regular rhythm and normal heart sounds.  Exam reveals no gallop and no friction rub.    No murmur heard.  Pulses:       Dorsalis pedis pulses are 1+ on the right side, and 0 on the left side.        Posterior tibial pulses are 0 on the left side.   Pulmonary/Chest: Effort normal. No respiratory distress. She has no wheezes. She has no rales.   diminished bs bilat   Abdominal: Soft. Bowel sounds are normal. She exhibits no distension. There is no tenderness. There is no guarding.   Musculoskeletal: She exhibits no edema or tenderness.   Neurological: She is alert and oriented to person, place, and time.   Skin: Skin is warm and dry. She is not diaphoretic.   Psychiatric: She has a normal mood and affect.       Assessment/Plan   Zo was seen today for coronary artery disease, hypertension and hyperlipidemia.    Diagnoses and all orders for this visit:    PAD (peripheral artery disease) (CMS/[facility])  Comments:  NO CLAUDICATON AND FOLLOWED BY VASC SURG    Coronary artery disease involving native coronary artery of native heart without angina pectoris  Comments:  NO ANGINA AFTER IMI AND PCI 3/18 - CPT  Orders:  -     ECG 12 Lead    Essential hypertension  Comments:  CONTROLLED    Mixed hyperlipidemia  Comments:  ON POTENT STATIN    Tobacco abuse  Comments:  NO DESIRE TO QUIT IN SPITE OF SIGNIFICANT HEALTH COMPLICATIONS - CONT TO ENCOURAGE ANY CUT-BACK                 Return in about 6 months (around 1/31/2019), or with apc.  Orders Placed This Encounter   Procedures   • ECG 12 Lead     Order Specific Question:   Reason for Exam:     Answer:   cad

## 2018-08-17 ENCOUNTER — HOSPITAL ENCOUNTER (OUTPATIENT)
Dept: ULTRASOUND IMAGING | Facility: HOSPITAL | Age: 78
Discharge: HOME | End: 2018-08-17

## 2018-08-17 ENCOUNTER — HOSPITAL ENCOUNTER (OUTPATIENT)
Dept: ULTRASOUND IMAGING | Facility: HOSPITAL | Age: 78
Discharge: HOME | End: 2018-08-17
Admitting: NURSE PRACTITIONER

## 2018-08-17 DIAGNOSIS — I73.9 PAD (PERIPHERAL ARTERY DISEASE) (HCC): ICD-10-CM

## 2018-08-17 DIAGNOSIS — Z95.828 S/P FEMORAL-FEMORAL BYPASS SURGERY: ICD-10-CM

## 2018-08-17 DIAGNOSIS — I65.23 BILATERAL CAROTID ARTERY STENOSIS: ICD-10-CM

## 2018-08-17 DIAGNOSIS — I71.40 ABDOMINAL AORTIC ANEURYSM (AAA) WITHOUT RUPTURE (HCC): ICD-10-CM

## 2018-08-17 PROCEDURE — 93880 EXTRACRANIAL BILAT STUDY: CPT | Performed by: SURGERY

## 2018-08-17 PROCEDURE — 93880 EXTRACRANIAL BILAT STUDY: CPT

## 2018-08-17 PROCEDURE — 93923 UPR/LXTR ART STDY 3+ LVLS: CPT

## 2018-08-17 PROCEDURE — 76775 US EXAM ABDO BACK WALL LIM: CPT

## 2018-08-17 PROCEDURE — 93925 LOWER EXTREMITY STUDY: CPT | Performed by: SURGERY

## 2018-08-17 PROCEDURE — 93924 LWR XTR VASC STDY BILAT: CPT | Performed by: SURGERY

## 2018-08-24 ENCOUNTER — OFFICE VISIT (OUTPATIENT)
Dept: VASCULAR SURGERY | Facility: CLINIC | Age: 78
End: 2018-08-24

## 2018-08-24 VITALS
HEART RATE: 64 BPM | HEIGHT: 62 IN | DIASTOLIC BLOOD PRESSURE: 82 MMHG | BODY MASS INDEX: 24.66 KG/M2 | SYSTOLIC BLOOD PRESSURE: 140 MMHG | WEIGHT: 134 LBS

## 2018-08-24 DIAGNOSIS — E78.2 MIXED HYPERLIPIDEMIA: ICD-10-CM

## 2018-08-24 DIAGNOSIS — I73.9 PAD (PERIPHERAL ARTERY DISEASE) (HCC): ICD-10-CM

## 2018-08-24 DIAGNOSIS — Z95.828 S/P FEMOROPOPLITEAL BYPASS SURGERY: ICD-10-CM

## 2018-08-24 DIAGNOSIS — I71.40 AAA (ABDOMINAL AORTIC ANEURYSM) WITHOUT RUPTURE (HCC): Primary | ICD-10-CM

## 2018-08-24 DIAGNOSIS — Z72.0 TOBACCO ABUSE: ICD-10-CM

## 2018-08-24 DIAGNOSIS — I10 ESSENTIAL HYPERTENSION: ICD-10-CM

## 2018-08-24 DIAGNOSIS — I65.23 BILATERAL CAROTID ARTERY STENOSIS: ICD-10-CM

## 2018-08-24 PROCEDURE — 99213 OFFICE O/P EST LOW 20 MIN: CPT | Performed by: NURSE PRACTITIONER

## 2018-08-24 RX ORDER — SIMVASTATIN 20 MG
TABLET ORAL
COMMUNITY
Start: 2018-08-01 | End: 2020-01-01

## 2018-08-24 RX ORDER — MONTELUKAST SODIUM 10 MG/1
TABLET ORAL
COMMUNITY
Start: 2018-08-01 | End: 2019-09-03

## 2018-08-24 NOTE — PROGRESS NOTES
"08/24/2018       Kassi Acevedo MD  [id number] [facility] [id number]    Zo Stout  1940    Chief Complaint   Patient presents with   • Follow-up     Follow up for RANI,aorta,and BLE doppler and,carotid study results.Can only walk short distances due to leg pain and weakness worse on left.Denies stroke type symptoms.Sharp pain left kidney at night relieved after voiding.       Dear Kassi Acevedo MD        HPI  I had the pleasure of seeing your patient Zo Stout in the office today.  As you recall, Zo Stout is a 77 y.o.  female who was seen in the hospital with complaints of chest pain.  She underwent heart catheterization.  After the case was noted to have an acutely ischemic left lower extremity.  She has had multiple vascular interventions by physician in [place].  Dr. Alexis did take her to surgery that day for thrombectomy of the fem-fem bypass graft and her left femoropopliteal bypass.  She is doing well since the procedure.  I did review records from [place].  She is having complaints of weakness to her lower extremities, with existing nerve damage to left leg from previous surgeries.  I did review most recent testing in January.  She does have a history of chronic back pain as well.  She has had injections previously.  Unfortunately, she does smoke about 1/2 pack per day.      Review of Systems   HENT: Negative.    Eyes: Negative.    Respiratory: Positive for shortness of breath.    Cardiovascular: Negative.         Leg pain bilaterally, left slightly worse   Gastrointestinal: Negative.    Endocrine: Negative.    Genitourinary: Negative.    Musculoskeletal: Positive for back pain.   Skin: Negative.    Allergic/Immunologic: Negative.    Neurological: Positive for weakness.   Hematological: Negative.    Psychiatric/Behavioral: Negative.        /82   Pulse 64   Ht 156.2 cm (61.5\")   Wt 60.8 kg (134 lb)   BMI 24.91 kg/m²   Physical Exam   Constitutional: She is oriented to " person, place, and time. She appears well-developed and well-nourished. No distress.   HENT:   Head: Normocephalic and atraumatic.   Mouth/Throat: Oropharynx is clear and moist.   Eyes: Pupils are equal, round, and reactive to light. No scleral icterus.   Neck: Normal range of motion. Neck supple. No JVD present. Carotid bruit is not present. No thyromegaly present.   Cardiovascular: Normal rate, regular rhythm, S2 normal, normal heart sounds, intact distal pulses and normal pulses.  Exam reveals no gallop and no friction rub.    No murmur heard.  Pulses:       Femoral pulses are 2+ on the right side, and 2+ on the left side.       Dorsalis pedis pulses are 2+ on the right side, and 2+ on the left side.   Doppler signals PT bilaterally, palp DP   Pulmonary/Chest: Effort normal. She has decreased breath sounds.   Abdominal: Soft. Normal aorta and bowel sounds are normal. There is no hepatosplenomegaly.   Musculoskeletal: Normal range of motion. She exhibits edema (mild bilaterally).   Neurological: She is alert and oriented to person, place, and time. No cranial nerve deficit.   Skin: Skin is warm and dry. She is not diaphoretic.   Psychiatric: She has a normal mood and affect. Her behavior is normal. Judgment and thought content normal.   Nursing note and vitals reviewed.      Us Carotid Bilateral    Result Date: 8/18/2018  Narrative: History: Carotid occlusive disease      Impression: Impression: 1. There is less than 50% stenosis of the right internal carotid artery. 2. There is less than 50% stenosis of the left internal carotid artery. 3. Antegrade flow is demonstrated in bilateral vertebral arteries.  Comments: Bilateral carotid vertebral arterial duplex scan was performed.  Grayscale imaging shows intimal thickening and calcified elements at the carotid bifurcation. The right internal carotid artery peak systolic velocity is 82.1 cm/sec. The end-diastolic velocity is 28.7 cm/sec. The right ICA/CCA ratio is  approximately 1.1 . These findings correlate with less than 50% stenosis of the right internal carotid artery.  Grayscale imaging shows intimal thickening and calcified elements at the carotid bifurcation. The left internal carotid artery peak systolic velocity is 91.5 cm/sec. The end-diastolic velocity is 32.8 cm/sec. The left ICA/CCA ratio is approximately 1.12 . These findings correlate with less than 50% stenosis of the left internal carotid artery.  Antegrade flow is demonstrated in bilateral vertebral arteries.    This report was finalized on 08/18/2018 11:21 by Dr. Nicolás Alexis MD.    Us Ankle / Brachial Indices Extremity Complete    Result Date: 8/18/2018  Narrative:  History: PAD  Comments: Bilateral lower extremity arterial with multi-level pulse volume recordings and segmental pressures were performed at rest and stress.  The right ankle/brachial index is 1.19. The waveforms are triphasic without dampening.These findings are consistent with no significant arterial insufficiency of the right lower extremity at rest.  The postexercise ABIs 1.12.  The left ankle/brachial index is 1.22. The waveforms are triphasic without dampening. These findings are consistent with no significant arterial insufficiency of the left lower extremity at rest.    The postexercise ABIs 1.05.      Impression: Impression: 1. No significant arterial insufficiency of the right lower extremity at rest. 2. No significant arterial insufficiency of the left lower extremity at rest.   This report was finalized on 08/18/2018 11:22 by Dr. Nicolás Alexis MD.    Us Aorta Limited    Result Date: 8/17/2018  Narrative: EXAMINATION: US AORTA LIMITED- 8/17/2018 8:54 AM CDT  HISTORY: AAA; I71.4-Abdominal aortic aneurysm, without rupture.  REPORT: Sonographic images of the abdominal aorta were obtained. Comparison is made with CT of the abdomen without contrast 7/10/2017.  The proximal IVC is visualized and appears normal in caliber. The  proximal aorta measures 2.4 x 3.1 cm, the mid aorta measures 3.5 x 3.6 cm, the distal aorta measures 2.8 x 3.2 cm. Previous endograft repair of the aorta is noted. This is demonstrated better on CT. On the previous CT, the aorta has a maximum diameter 4.3 cm.      Impression: 1. Evidence of previous endograft repair of the abdominal aorta, which has a maximum diameter of 3.5 x 3.6 cm currently. This report was finalized on 08/17/2018 08:56 by Dr. James Cortez MD.    Us Arterial Doppler Lower Extremity Bilateral    Result Date: 8/18/2018  Narrative:  History: PAD.  Comments: Gray scale imaging as well as color flow duplex were used to evaluate the right to left femorofemoral bypass graft and left femoropopliteal bypass graft.  The peak systolic velocity in the right common femoral artery measured 164 cm/s. In the profunda femoris artery measured 84.5 cm/s. In the proximal SFA measured 164 cm/s. In the mid SFA measured 136 cm/s. In the distal SFA measured 151 cm/s. In the popliteal artery measured 71.5 cm/s. In the posterior tibial artery measured 55 cm/s. In the anterior tibial artery measured 76.2 cm/s.  At the proximal anastomosis of the femorofemoral bypass graft the peak systolic velocity measured 361 cm/s. In the proximal graft measured 200 cm/s. In the mid graft measured 106 cm/s. In the distal graft measured 95 cm/s. At the distal anastomosis measured 161 cm/s.  In the femoropopliteal proximal anastomosis the peak systolic velocity measured 95 cm/s. In the proximal graft measured 82.5 cm/s. In the mid graft measured 85.1 cm/s. In the distal graft measured 90 cm/s. The distal anastomosis was not well-visualized. In the outflow popliteal artery measured 49.8 cm/s. In the posterior tibial artery measured 65.5 cm/s. In the anterior tibial artery measured 87.7 cm/s.      Impression: Patent right lower extremity arterial system without evidence of significant stenosis. The femorofemoral bypass graft is widely  patent as well as the left lower extremity femoropopliteal bypass graft.       This report was finalized on 08/18/2018 11:25 by Dr. Nicolás Alexis MD.      Patient Active Problem List   Diagnosis   • ST elevation myocardial infarction involving right coronary artery (CMS/[facility])   • PAD (peripheral artery disease) (CMS/[facility])   • Chronic bronchitis (CMS/[facility])   • Tobacco consumption   • Tobacco abuse   • Coronary artery disease involving native coronary artery of native heart without angina pectoris   • Weakness   • Peripheral vascular disease (CMS/[facility])   • Essential hypertension   • Mixed hyperlipidemia         ICD-10-CM ICD-9-CM   1. AAA (abdominal aortic aneurysm) without rupture (CMS/[facility]) I71.4 441.4   2. PAD (peripheral artery disease) (CMS/[facility]) I73.9 443.9   3. S/P femoropopliteal bypass surgery Z95.828 V45.89   4. Bilateral carotid artery stenosis I65.23 433.10     433.30   5. Essential hypertension I10 401.9   6. Mixed hyperlipidemia E78.2 272.2   7. Tobacco abuse Z72.0 305.1           Plan: After thoroughly evaluating Zo Stout, I believe the best course of action is to remian conservative from a vascular standpoint.  She has palpable DP bilaterally and doppler PT signals.  Overall, her vascular status appears intact.  Her testing looks great.  We will see her back in 6 months with repeat noninvasive testing, including an abdominal ultrasound, a carotid duplex, an arterial duplex of her femorofemoral bypass, and ABIs.  I did explain to call us with any needs sooner.  She needs to continue on her aspirin and brilinta as well as lipitor.  I did discuss vascular risk factors as they pertain to the progression of vascular disease including controlling her smoking cessation.  I advised the patient of the risks in continuing to use tobacco, and I provided this patient with smoking cessation educational materials.  During this visit, I spent > 3-10 minutes counseling the patient regarding smoking cessation.   Body mass index is 24.91 kg/m².    The patient can continue taking her current medication regimen as previously planned.  This was all discussed in full with complete understanding.    Thank you for allowing me to participate in the care of your patient.  Please do not hesitate with any questions or concerns.  I will keep you aware of any further encounters with Zo Stout.        Sincerely yours,         RUPERT Munoz Gemo Y, MD

## 2018-10-23 ENCOUNTER — TRANSCRIBE ORDERS (OUTPATIENT)
Dept: ADMINISTRATIVE | Facility: HOSPITAL | Age: 78
End: 2018-10-23

## 2018-10-23 DIAGNOSIS — Z98.890 HISTORY OF THORACIC AORTIC ANEURYSM REPAIR: ICD-10-CM

## 2018-10-23 DIAGNOSIS — Z86.79 HISTORY OF THORACIC AORTIC ANEURYSM REPAIR: ICD-10-CM

## 2018-10-23 DIAGNOSIS — I71.20 THORACIC AORTIC ANEURYSM WITHOUT RUPTURE (HCC): Primary | ICD-10-CM

## 2018-10-29 ENCOUNTER — HOSPITAL ENCOUNTER (OUTPATIENT)
Dept: CT IMAGING | Facility: HOSPITAL | Age: 78
Discharge: HOME | End: 2018-10-29

## 2018-10-29 ENCOUNTER — HOSPITAL ENCOUNTER (OUTPATIENT)
Dept: ULTRASOUND IMAGING | Facility: HOSPITAL | Age: 78
Discharge: HOME | End: 2018-10-29
Admitting: GENERAL PRACTICE

## 2018-10-29 DIAGNOSIS — Z98.890 HISTORY OF THORACIC AORTIC ANEURYSM REPAIR: ICD-10-CM

## 2018-10-29 DIAGNOSIS — Z86.79 HISTORY OF THORACIC AORTIC ANEURYSM REPAIR: ICD-10-CM

## 2018-10-29 DIAGNOSIS — I71.20 THORACIC AORTIC ANEURYSM WITHOUT RUPTURE (HCC): ICD-10-CM

## 2018-10-29 PROCEDURE — 71250 CT THORAX DX C-: CPT

## 2018-12-12 NOTE — PATIENT INSTRUCTIONS
Health Risks of Smoking  Smoking cigarettes is very bad for your health. Tobacco smoke has over 200 known poisons in it. It contains the poisonous gases nitrogen oxide and carbon monoxide. There are over 60 chemicals in tobacco smoke that cause cancer.  Smoking is difficult to quit because a chemical in tobacco, called nicotine, causes addiction or dependence. When you smoke and inhale, nicotine is absorbed rapidly into the bloodstream through your lungs. Both inhaled and non-inhaled nicotine may be addictive.  What are the risks of cigarette smoke?  Cigarette smokers have an increased risk of many serious medical problems, including:  · Lung cancer.  · Lung disease, such as pneumonia, bronchitis, and emphysema.  · Chest pain (angina) and heart attack because the heart is not getting enough oxygen.  · Heart disease and peripheral blood vessel disease.  · High blood pressure (hypertension).  · Stroke.  · Oral cancer, including cancer of the lip, mouth, or voice box.  · Bladder cancer.  · Pancreatic cancer.  · Cervical cancer.  · Pregnancy complications, including premature birth.  · Stillbirths and smaller  babies, birth defects, and genetic damage to sperm.  · Early menopause.  · Lower estrogen level for women.  · Infertility.  · Facial wrinkles.  · Blindness.  · Increased risk of broken bones (fractures).  · Senile dementia.  · Stomach ulcers and internal bleeding.  · Delayed wound healing and increased risk of complications during surgery.  · Even smoking lightly shortens your life expectancy by several years.    Because of secondhand smoke exposure, children of smokers have an increased risk of the following:  · Sudden infant death syndrome (SIDS).  · Respiratory infections.  · Lung cancer.  · Heart disease.  · Ear infections.    What are the benefits of quitting?  There are many health benefits of quitting smoking. Here are some of them:  · Within days of quitting smoking, your risk of having a heart  attack decreases, your blood flow improves, and your lung capacity improves. Blood pressure, pulse rate, and breathing patterns start returning to normal soon after quitting.  · Within months, your lungs may clear up completely.  · Quitting for 10 years reduces your risk of developing lung cancer and heart disease to almost that of a nonsmoker.  · People who quit may see an improvement in their overall quality of life.    How do I quit smoking?  Smoking is an addiction with both physical and psychological effects, and longtime habits can be hard to change. Your health care provider can recommend:  · Programs and community resources, which may include group support, education, or talk therapy.  · Prescription medicines to help reduce cravings.  · Nicotine replacement products, such as patches, gum, and nasal sprays. Use these products only as directed. Do not replace cigarette smoking with electronic cigarettes, which are commonly called e-cigarettes. The safety of e-cigarettes is not known, and some may contain harmful chemicals.  · A combination of two or more of these methods.    Where to find more information:  · American Lung Association: www.lung.org  · American Cancer Society: www.cancer.org  Summary  · Smoking cigarettes is very bad for your health. Cigarette smokers have an increased risk of many serious medical problems, including several cancers, heart disease, and stroke.  · Smoking is an addiction with both physical and psychological effects, and longtime habits can be hard to change.  · By stopping right away, you can greatly reduce the risk of medical problems for you and your family.  · To help you quit smoking, your health care provider can recommend programs, community resources, prescription medicines, and nicotine replacement products such as patches, gum, and nasal sprays.  This information is not intended to replace advice given to you by your health care provider. Make sure you discuss any  questions you have with your health care provider.  Document Released: 01/25/2006 Document Revised: 12/22/2017 Document Reviewed: 12/22/2017  [employer] Interactive Patient Education © 2017 [employer]  For more information:    Quit Now [place]  1-800-QUIT-NOW  https://[place].quitlogix.org/en-[place]/  Steps to Quit Smoking  Smoking tobacco can be harmful to your health and can affect almost every organ in your body. Smoking puts you, and those around you, at risk for developing many serious chronic diseases. Quitting smoking is difficult, but it is one of the best things that you can do for your health. It is never too late to quit.  What are the benefits of quitting smoking?  When you quit smoking, you lower your risk of developing serious diseases and conditions, such as:  · Lung cancer or lung disease, such as COPD.  · Heart disease.  · Stroke.  · Heart attack.  · Infertility.  · Osteoporosis and bone fractures.  Additionally, symptoms such as coughing, wheezing, and shortness of breath may get better when you quit. You may also find that you get sick less often because your body is stronger at fighting off colds and infections. If you are pregnant, quitting smoking can help to reduce your chances of having a baby of low birth weight.  How do I get ready to quit?  When you decide to quit smoking, create a plan to make sure that you are successful. Before you quit:  · Pick a date to quit. Set a date within the next two weeks to give you time to prepare.  · Write down the reasons why you are quitting. Keep this list in places where you will see it often, such as on your bathroom mirror or in your car or wallet.  · Identify the people, places, things, and activities that make you want to smoke (triggers) and avoid them. Make sure to take these actions:  ¨ Throw away all cigarettes at home, at work, and in your car.  ¨ Throw away smoking accessories, such as ashtrays and lighters.  ¨ Clean your car and make sure to  empty the ashtray.  ¨ Clean your home, including curtains and carpets.  · Tell your family, friends, and coworkers that you are quitting. Support from your loved ones can make quitting easier.  · Talk with your health care provider about your options for quitting smoking.  · Find out what treatment options are covered by your health insurance.  What strategies can I use to quit smoking?  Talk with your healthcare provider about different strategies to quit smoking. Some strategies include:  · Quitting smoking altogether instead of gradually lessening how much you smoke over a period of time. Research shows that quitting “cold turkey” is more successful than gradually quitting.  · Attending in-person counseling to help you build problem-solving skills. You are more likely to have success in quitting if you attend several counseling sessions. Even short sessions of 10 minutes can be effective.  · Finding resources and support systems that can help you to quit smoking and remain smoke-free after you quit. These resources are most helpful when you use them often. They can include:  ¨ Online chats with a counselor.  ¨ Telephone quitlines.  ¨ Printed self-help materials.  ¨ Support groups or group counseling.  ¨ Text messaging programs.  ¨ Mobile phone applications.  · Taking medicines to help you quit smoking. (If you are pregnant or breastfeeding, talk with your health care provider first.) Some medicines contain nicotine and some do not. Both types of medicines help with cravings, but the medicines that include nicotine help to relieve withdrawal symptoms. Your health care provider may recommend:  ¨ Nicotine patches, gum, or lozenges.  ¨ Nicotine inhalers or sprays.  ¨ Non-nicotine medicine that is taken by mouth.  Talk with your health care provider about combining strategies, such as taking medicines while you are also receiving in-person counseling. Using these two strategies together makes you more likely to  succeed in quitting than if you used either strategy on its own.  If you are pregnant or breastfeeding, talk with your health care provider about finding counseling or other support strategies to quit smoking. Do not take medicine to help you quit smoking unless told to do so by your health care provider.  What things can I do to make it easier to quit?  Quitting smoking might feel overwhelming at first, but there is a lot that you can do to make it easier. Take these important actions:  · Reach out to your family and friends and ask that they support and encourage you during this time. Call telephone quitlines, reach out to support groups, or work with a counselor for support.  · Ask people who smoke to avoid smoking around you.  · Avoid places that trigger you to smoke, such as bars, parties, or smoke-break areas at work.  · Spend time around people who do not smoke.  · Lessen stress in your life, because stress can be a smoking trigger for some people. To lessen stress, try:  ¨ Exercising regularly.  ¨ Deep-breathing exercises.  ¨ Yoga.  ¨ Meditating.  ¨ Performing a body scan. This involves closing your eyes, scanning your body from head to toe, and noticing which parts of your body are particularly tense. Purposefully relax the muscles in those areas.  · Download or purchase mobile phone or tablet apps (applications) that can help you stick to your quit plan by providing reminders, tips, and encouragement. There are many free apps, such as QuitGuide from the CDC (Centers for Disease Control and Prevention). You can find other support for quitting smoking (smoking cessation) through smokefree.gov and other websites.  How will I feel when I quit smoking?  Within the first 24 hours of quitting smoking, you may start to feel some withdrawal symptoms. These symptoms are usually most noticeable 2-3 days after quitting, but they usually do not last beyond 2-3 weeks. Changes or symptoms that you might experience  include:  · Mood swings.  · Restlessness, anxiety, or irritation.  · Difficulty concentrating.  · Dizziness.  · Strong cravings for sugary foods in addition to nicotine.  · Mild weight gain.  · Constipation.  · Nausea.  · Coughing or a sore throat.  · Changes in how your medicines work in your body.  · A depressed mood.  · Difficulty sleeping (insomnia).  After the first 2-3 weeks of quitting, you may start to notice more positive results, such as:  · Improved sense of smell and taste.  · Decreased coughing and sore throat.  · Slower heart rate.  · Lower blood pressure.  · Clearer skin.  · The ability to breathe more easily.  · Fewer sick days.  Quitting smoking is very challenging for most people. Do not get discouraged if you are not successful the first time. Some people need to make many attempts to quit before they achieve long-term success. Do your best to stick to your quit plan, and talk with your health care provider if you have any questions or concerns.  This information is not intended to replace advice given to you by your health care provider. Make sure you discuss any questions you have with your health care provider.  Document Released: 12/12/2002 Document Revised: 08/15/2017 Document Reviewed: 05/03/2016  [employer] Interactive Patient Education © 2017 [employer]

## 2019-02-18 ENCOUNTER — HOSPITAL ENCOUNTER (OUTPATIENT)
Dept: ULTRASOUND IMAGING | Facility: HOSPITAL | Age: 79
Discharge: HOME | End: 2019-02-18

## 2019-02-18 ENCOUNTER — HOSPITAL ENCOUNTER (OUTPATIENT)
Dept: ULTRASOUND IMAGING | Facility: HOSPITAL | Age: 79
Discharge: HOME | End: 2019-02-18
Admitting: NURSE PRACTITIONER

## 2019-02-18 DIAGNOSIS — Z95.828 S/P FEMOROPOPLITEAL BYPASS SURGERY: ICD-10-CM

## 2019-02-18 DIAGNOSIS — I73.9 PAD (PERIPHERAL ARTERY DISEASE) (HCC): ICD-10-CM

## 2019-02-18 DIAGNOSIS — I65.23 BILATERAL CAROTID ARTERY STENOSIS: ICD-10-CM

## 2019-02-18 DIAGNOSIS — I71.40 AAA (ABDOMINAL AORTIC ANEURYSM) WITHOUT RUPTURE (HCC): ICD-10-CM

## 2019-02-18 PROCEDURE — 93924 LWR XTR VASC STDY BILAT: CPT | Performed by: SURGERY

## 2019-02-18 PROCEDURE — 93880 EXTRACRANIAL BILAT STUDY: CPT | Performed by: SURGERY

## 2019-02-18 PROCEDURE — 93880 EXTRACRANIAL BILAT STUDY: CPT

## 2019-02-18 PROCEDURE — 93925 LOWER EXTREMITY STUDY: CPT | Performed by: SURGERY

## 2019-02-18 PROCEDURE — 93923 UPR/LXTR ART STDY 3+ LVLS: CPT

## 2019-02-18 PROCEDURE — 76775 US EXAM ABDO BACK WALL LIM: CPT

## 2019-02-28 ENCOUNTER — TELEPHONE (OUTPATIENT)
Dept: VASCULAR SURGERY | Facility: CLINIC | Age: 79
End: 2019-02-28

## 2019-02-28 NOTE — TELEPHONE ENCOUNTER
S/w pt confirmed appt to see Alayna EMERY on march 1st.  Testing was completed on 2/18/19. LESLIE

## 2019-03-01 ENCOUNTER — OFFICE VISIT (OUTPATIENT)
Dept: VASCULAR SURGERY | Facility: CLINIC | Age: 79
End: 2019-03-01

## 2019-03-01 VITALS
SYSTOLIC BLOOD PRESSURE: 142 MMHG | HEIGHT: 62 IN | HEART RATE: 113 BPM | WEIGHT: 137 LBS | BODY MASS INDEX: 25.21 KG/M2 | OXYGEN SATURATION: 98 % | DIASTOLIC BLOOD PRESSURE: 80 MMHG

## 2019-03-01 DIAGNOSIS — Z72.0 TOBACCO ABUSE: ICD-10-CM

## 2019-03-01 DIAGNOSIS — I65.23 BILATERAL CAROTID ARTERY STENOSIS: ICD-10-CM

## 2019-03-01 DIAGNOSIS — I73.9 PAD (PERIPHERAL ARTERY DISEASE) (HCC): Primary | ICD-10-CM

## 2019-03-01 DIAGNOSIS — I71.40 ABDOMINAL AORTIC ANEURYSM (AAA) WITHOUT RUPTURE (HCC): ICD-10-CM

## 2019-03-01 DIAGNOSIS — I10 ESSENTIAL HYPERTENSION: ICD-10-CM

## 2019-03-01 DIAGNOSIS — Z71.6 TOBACCO ABUSE COUNSELING: ICD-10-CM

## 2019-03-01 DIAGNOSIS — E78.2 MIXED HYPERLIPIDEMIA: ICD-10-CM

## 2019-03-01 PROCEDURE — 99214 OFFICE O/P EST MOD 30 MIN: CPT | Performed by: NURSE PRACTITIONER

## 2019-03-01 RX ORDER — IRBESARTAN 300 MG/1
300 TABLET ORAL NIGHTLY
COMMUNITY
End: 2020-03-05

## 2019-03-01 NOTE — PROGRESS NOTES
03/01/2019       Kassi Acevedo MD  [id number] [facility] [id number]    Zo Stout  1940    Chief Complaint   Patient presents with   • Follow-up     6 Month Follow Up For Abdominal Aortic Aneurysm without Rupture, Peripheral Artery Disease, Bilateral Carotid Artery Stenosis, and S/P Femoropopliteal Bypass Surgery. Test 02/18/19 US pad aorta miramontes, US pad lower ext arteries bilat, US pad ankle / brach ind ext comp, and US pad carotid bilateral. Patient denies any stroke like symptoms.    • other     Patient states left leg giving her problems it goes out on her from time to time but the burning, drawing, and aching pain is constant. Patient states when she goes to bed her bilateral feet feel like ice, states they are good throughout the day until she lays down for bed.        Dear Kassi Acevedo MD        HPI  I had the pleasure of seeing your patient Zo Stout in the office today.  As you recall, Zo Stout is a 78 y.o.  female who was seen in the hospital with complaints of chest pain.  She underwent heart catheterization.  After the case was noted to have an acutely ischemic left lower extremity.  She has had multiple vascular interventions by physician in [place].  Dr. Alexis did take her to surgery that day for thrombectomy of the fem-fem bypass graft and her left femoropopliteal bypass.  She is doing well since the procedure.  I did review records from [place].  She is having complaints of weakness to her lower extremities, with existing nerve damage to left leg from previous surgeries.  I did review most recent testing in January.  She does have a history of chronic back pain as well.  She has had injections previously.  Unfortunately, she does smoke about 1/2 pack per day.      Review of Systems   HENT: Negative.    Eyes: Negative.    Respiratory: Positive for shortness of breath.    Cardiovascular: Negative.         Leg pain bilaterally, left slightly worse   Gastrointestinal: Negative.  "   Endocrine: Negative.    Genitourinary: Negative.    Musculoskeletal: Positive for back pain.   Skin: Negative.    Allergic/Immunologic: Negative.    Neurological: Positive for weakness.   Hematological: Negative.    Psychiatric/Behavioral: Negative.        /80 (BP Location: Right arm, Patient Position: Sitting, Cuff Size: Adult)   Pulse 113   Ht 156.2 cm (61.5\")   Wt 62.1 kg (137 lb)   SpO2 98%   BMI 25.47 kg/m²   Physical Exam   Constitutional: She is oriented to person, place, and time. She appears well-developed and well-nourished. No distress.   HENT:   Head: Normocephalic and atraumatic.   Mouth/Throat: Oropharynx is clear and moist.   Eyes: Pupils are equal, round, and reactive to light. No scleral icterus.   Neck: Normal range of motion. Neck supple. No JVD present. Carotid bruit is not present. No thyromegaly present.   Cardiovascular: Normal rate, regular rhythm, S2 normal, normal heart sounds, intact distal pulses and normal pulses. Exam reveals no gallop and no friction rub.   No murmur heard.  Pulses:       Femoral pulses are 2+ on the right side, and 2+ on the left side.       Dorsalis pedis pulses are 2+ on the right side, and 2+ on the left side.   Doppler signals PT bilaterally, palp DP   Pulmonary/Chest: Effort normal. No respiratory distress. She has decreased breath sounds.   Abdominal: Soft. Normal aorta and bowel sounds are normal. There is no hepatosplenomegaly.   Musculoskeletal: Normal range of motion. She exhibits edema (mild bilaterally).   Neurological: She is alert and oriented to person, place, and time. No cranial nerve deficit.   Skin: Skin is warm and dry. She is not diaphoretic.   Psychiatric: She has a normal mood and affect. Her behavior is normal. Judgment and thought content normal.   Nursing note and vitals reviewed.      Us Carotid Bilateral    Result Date: 2/18/2019  Narrative: History: Carotid occlusive disease      Impression: Impression: 1. There is less than " 50% stenosis of the right internal carotid artery. 2. There is less than 50% stenosis of the left internal carotid artery. 3. Antegrade flow is demonstrated in bilateral vertebral arteries.  Comments: Bilateral carotid vertebral arterial duplex scan was performed.  Grayscale imaging shows intimal thickening and calcified elements at the carotid bifurcation. The right internal carotid artery peak systolic velocity is 63.3 cm/sec. The end-diastolic velocity is 20.4 cm/sec. The right ICA/CCA ratio is approximately 0.86 . These findings correlate with less than 50% stenosis of the right internal carotid artery.  Grayscale imaging shows intimal thickening and calcified elements at the carotid bifurcation. The left internal carotid artery peak systolic velocity is 91.5 cm/sec. The end-diastolic velocity is 34.6 cm/sec. The left ICA/CCA ratio is approximately 2.14 . These findings correlate with less than 50% stenosis of the left internal carotid artery.  Antegrade flow is demonstrated in bilateral vertebral arteries.    This report was finalized on 02/18/2019 15:35 by Dr. Victor Manuel Connor MD.    Us Ankle / Brachial Indices Extremity Complete    Result Date: 2/18/2019  Narrative:  History: [facility]  Comments: Bilateral lower extremity arterial with multi-level pulse volume recordings and segmental pressures were performed at rest and stress.  The right ankle/brachial index is 1.08. Doppler waveforms are biphasic and PVR waveforms at the ankle are mildly dampened.These findings are consistent with no significant arterial insufficiency of the right lower extremity at rest.      The left ankle/brachial index is 1.14. Doppler waveforms are biphasic and PVR waveforms at the ankle are normal appearing with no significant dampening. These findings are consistent with no significant arterial insufficiency of the left lower extremity at rest.            Impression: Impression: 1. No significant arterial insufficiency of the right lower  extremity at rest. 2. No significant arterial insufficiency of the left lower extremity at rest.   This report was finalized on 02/18/2019 15:36 by Dr. Victor Manuel Connor MD.    Us Aorta Limited    Result Date: 2/18/2019  Narrative: Examination: US AORTA LIMITED-  Date:  2/18/2019  Indication:    78 years-year-old Female with AAA; I71.4-Abdominal aortic aneurysm, without rupture  Comparison: None.  Findings: Grayscale sonographic imaging of the abdominal aorta was performed. Please note that measurements reflect inner luminal diameters as the true outer aspect of the abdominal aorta is difficult to appreciate by sonography. Color and pulse Doppler imaging was also performed. Prior endovascular treatment bed as seen on CT abdomen pelvis dated 7/10/2017. The cephalad abdominal aorta measures 2.9 x 3.1 cm. The mid abdominal aorta measures 3.3 x 3.7 cm. The caudal abdominal aorta measures 3.7 x 3.6, previously 2.8 x 3.2 cm The right common iliac artery proximally measures 1.2 cm. The left common iliac artery proximally measures 1.3 cm.      Impression: Impression:  1.  Abdominal aorta aneurysm, status post endovascular repair. 2.  Distal aspect of the aortic aneurysm now measures 3.7 x 3.6 cm on today's examination, previously 2.8 x 3.2 cm. This report was finalized on 02/18/2019 12:47 by Dr. Leena Garcia MD.    Us Arterial Doppler Lower Extremity Bilateral    Result Date: 2/18/2019  Narrative: History: History of femoral-femoral bypass  Comments: Bilateral lower extremity arterial duplex was performed using gray scale imaging as well as color flow Doppler.  A previous right to left femoral to femoral artery bypass graft was created. At the proximal anastomosis the peak systolic velocity is 160 cm/s. In the proximal graft the peak systolic velocity is 107 cm/s. In the mid graft the peak systolic velocity is 122 cm/s. In the distal graft the peak systolic velocity is 79.9 cm/s. At the distal anastomosis the peak systolic  velocity is 50.0 cm's per second. The graft appears widely patent with no evidence of significant stenosis.  On the right common femoral artery peak systolic velocity is 149 cm/s, the proximal deep femoral artery peak systolic velocity is 82.7 cm/s, the proximal superficial femoral artery peak systolic velocity is 130 cm/s, the mid superficial femoral artery peak systolic velocity is 118 cm/s, the distal superficial femoral artery peak systolic velocity is 110 cm/s, the proximal popliteal artery peak systolic velocity is 51.3 cm/s, the distal posterior tibial artery peak systolic velocity is 43.0 cm/s, and the proximal anterior tibial artery peak systolic velocity is 66.1 cm/s. All arteries appear widely patent with no evidence of significant stenosis.  On the left there is a femoral to popliteal artery bypass graft. It originates approximately at the site of femoral to femoral artery bypass graft anastomosis. At the proximal anastomosis the peak systolic velocity is 134 cm/s. In the proximal graft the peak systolic velocity is 82.7 cm/s. In the mid graft the peak systolic velocity is 88.1 cm/s. The distal graft the peak systolic velocity is 53.9 cm/s. The distal anastomosis the peak systolic velocity is 110 cm/s. In the left popliteal artery the peak systolic velocity is 52.7 cm/s. In the left posterior tibial artery the peak systolic velocity is 63.2 cm/s. The left anterior tibial artery the peak systolic velocity is 73.7 cm/s. All visualized areas appear widely patent with no evidence of significant stenosis.      Impression: Impression: 1. Widely patent femoral to femoral artery bypass graft. 2. Widely patent right lower extremity arterial system with no evidence of significant stenosis. 3. Widely patent left femoral-popliteal artery bypass graft. Outflow popliteal artery and distal arterial system also appears widely patent.   This report was finalized on 02/18/2019 15:50 by Dr. Victor Manuel Connor MD.      Patient  Active Problem List   Diagnosis   • ST elevation myocardial infarction involving right coronary artery (CMS/[facility])   • PAD (peripheral artery disease) (CMS/[facility])   • Chronic bronchitis (CMS/[facility])   • Tobacco consumption   • Tobacco abuse   • Coronary artery disease involving native coronary artery of native heart without angina pectoris   • Weakness   • Peripheral vascular disease (CMS/[facility])   • Essential hypertension   • Mixed hyperlipidemia         ICD-10-CM ICD-9-CM   1. PAD (peripheral artery disease) (CMS/[facility]) I73.9 443.9   2. Bilateral carotid artery stenosis I65.23 433.10     433.30   3. Abdominal aortic aneurysm (AAA) without rupture (CMS/[facility]) I71.4 441.4   4. Essential hypertension I10 401.9   5. Mixed hyperlipidemia E78.2 272.2   6. Tobacco abuse Z72.0 305.1   7. Tobacco abuse counseling Z71.6 V65.42     305.1           Plan: After thoroughly evaluating Zo Stout, I believe the best course of action is to remian conservative from a vascular standpoint.  She has palpable DP bilaterally and doppler PT signals.  Overall, her vascular status appears intact.  Her testing looks great.  We will see her back in 6 months with repeat noninvasive testing, including an abdominal ultrasound, a carotid duplex, an arterial duplex of her femorofemoral bypass, and ABIs.  I did explain to call us with any needs sooner.  She needs to continue on her aspirin, brilinta as well as lipitor.  I did discuss vascular risk factors as they pertain to the progression of vascular disease including controlling her smoking cessation.  I advised the patient of the risks in continuing to use tobacco, and I provided this patient with smoking cessation educational materials.  During this visit, I spent > 3-10 minutes counseling the patient regarding smoking cessation.  Body mass index is 25.47 kg/m².    The patient can continue taking her current medication regimen as previously planned.  This was all discussed in full with complete  understanding.    Thank you for allowing me to participate in the care of your patient.  Please do not hesitate with any questions or concerns.  I will keep you aware of any further encounters with Zo Stout.        Sincerely yours,         RUPERT Munoz Gemo Y, MD

## 2019-04-04 RX ORDER — TICAGRELOR 90 MG/1
TABLET ORAL
Qty: 60 TABLET | Refills: 3 | Status: SHIPPED | OUTPATIENT
Start: 2019-04-04 | End: 2019-06-06 | Stop reason: SDUPTHER

## 2019-05-21 ENCOUNTER — TRANSCRIBE ORDERS (OUTPATIENT)
Dept: ADMINISTRATIVE | Facility: HOSPITAL | Age: 79
End: 2019-05-21

## 2019-05-21 DIAGNOSIS — N18.9 CHRONIC KIDNEY DISEASE, UNSPECIFIED CKD STAGE: Primary | ICD-10-CM

## 2019-05-24 ENCOUNTER — HOSPITAL ENCOUNTER (OUTPATIENT)
Dept: NUCLEAR MEDICINE | Facility: HOSPITAL | Age: 79
Discharge: HOME | End: 2019-05-24

## 2019-05-24 DIAGNOSIS — N18.9 CHRONIC KIDNEY DISEASE, UNSPECIFIED CKD STAGE: ICD-10-CM

## 2019-05-24 PROCEDURE — 0 TECHNETIUM MERTIATIDE: Performed by: UROLOGY

## 2019-05-24 PROCEDURE — A9562 TC99M MERTIATIDE: HCPCS | Performed by: UROLOGY

## 2019-05-24 PROCEDURE — 78708 K FLOW/FUNCT IMAGE W/DRUG: CPT

## 2019-05-24 PROCEDURE — 25010000002 FUROSEMIDE PER 20 MG: Performed by: UROLOGY

## 2019-05-24 RX ORDER — FUROSEMIDE 10 MG/ML
40 INJECTION INTRAMUSCULAR; INTRAVENOUS
Status: COMPLETED | OUTPATIENT
Start: 2019-05-24 | End: 2019-05-24

## 2019-05-24 RX ADMIN — FUROSEMIDE 40 MG: 10 INJECTION, SOLUTION INTRAVENOUS at 12:40

## 2019-05-24 RX ADMIN — TECHNESCAN TC 99M MERTIATIDE 1 DOSE: 1 INJECTION, POWDER, LYOPHILIZED, FOR SOLUTION INTRAVENOUS at 12:20

## 2019-05-31 ENCOUNTER — HOSPITAL ENCOUNTER (OUTPATIENT)
Dept: HOSPITAL 58 - CAR.REHAB | Age: 79
End: 2019-05-31
Attending: INTERNAL MEDICINE
Payer: COMMERCIAL

## 2019-05-31 VITALS — DIASTOLIC BLOOD PRESSURE: 54 MMHG | SYSTOLIC BLOOD PRESSURE: 112 MMHG

## 2019-05-31 VITALS — BODY MASS INDEX: 26.2 KG/M2

## 2019-05-31 DIAGNOSIS — I25.10: ICD-10-CM

## 2019-05-31 DIAGNOSIS — Z95.5: ICD-10-CM

## 2019-05-31 DIAGNOSIS — R07.9: Primary | ICD-10-CM

## 2019-06-26 ENCOUNTER — OFFICE VISIT (OUTPATIENT)
Dept: CARDIOLOGY | Facility: CLINIC | Age: 79
End: 2019-06-26

## 2019-06-26 VITALS
WEIGHT: 140 LBS | HEART RATE: 93 BPM | SYSTOLIC BLOOD PRESSURE: 101 MMHG | HEIGHT: 62 IN | DIASTOLIC BLOOD PRESSURE: 61 MMHG | BODY MASS INDEX: 25.76 KG/M2

## 2019-06-26 DIAGNOSIS — Z72.0 TOBACCO ABUSE: ICD-10-CM

## 2019-06-26 DIAGNOSIS — I25.10 CORONARY ARTERY DISEASE INVOLVING NATIVE CORONARY ARTERY OF NATIVE HEART WITHOUT ANGINA PECTORIS: Primary | ICD-10-CM

## 2019-06-26 DIAGNOSIS — J42 CHRONIC BRONCHITIS, UNSPECIFIED CHRONIC BRONCHITIS TYPE (HCC): ICD-10-CM

## 2019-06-26 DIAGNOSIS — I73.9 PAD (PERIPHERAL ARTERY DISEASE) (HCC): ICD-10-CM

## 2019-06-26 DIAGNOSIS — E78.2 MIXED HYPERLIPIDEMIA: ICD-10-CM

## 2019-06-26 DIAGNOSIS — I10 ESSENTIAL HYPERTENSION: ICD-10-CM

## 2019-06-26 PROCEDURE — 99214 OFFICE O/P EST MOD 30 MIN: CPT | Performed by: INTERNAL MEDICINE

## 2019-06-26 PROCEDURE — 93000 ELECTROCARDIOGRAM COMPLETE: CPT | Performed by: INTERNAL MEDICINE

## 2019-06-26 RX ORDER — CETIRIZINE HYDROCHLORIDE 10 MG/1
10 TABLET ORAL DAILY
COMMUNITY

## 2019-06-26 NOTE — PROGRESS NOTES
Subjective    Zo Stout is a 78 y.o. female. Fu of ihd, pad and risks    History of Present Illness     IHD:  Has had recurrent IMI's 2/2 RCA stent thromboses. Most recent was 3/19 and was in [facility] at the time. Those records are not available to me at this time but by report had another stent to the RCA. Had been on Brilinta at the time and is still on it. Is now in Rehab at [place] and is gaining strength and stamina. Has not had any more cp. Is compliant with meds but is still smoking. EKG shows only subtle differences in RF's and T's.    PAD:  Has had several LE revasc procedures and follows closely with Vasc Surg. Has no claudication at this time but has chronic ankle edema    HTN:  Relates good control at all checks on current meds. No light-headedness    HLD:  Is on a potent statin without myalgias. LDL checked by her pcp    TOBACCO USE:  Knows she needs to quit and has had extensive education. Relates a bad rxn to Chantix. Have had a 5 min discussion on NRT today and she is considering this again. Has been told again that this is the #1 priority now in tx her mult-bed atherosclerosis. In 2 mo will change Brilinta to low-dose Xarelto + ASA 81.         The following portions of the patient's history were reviewed and updated as appropriate: allergies, current medications, past family history, past medical history, past social history, past surgical history and problem list.    Patient Active Problem List   Diagnosis   • ST elevation myocardial infarction involving right coronary artery (CMS/HCC)   • PAD (peripheral artery disease) (CMS/HCC)   • Chronic bronchitis (CMS/HCC)   • Tobacco consumption   • Tobacco abuse   • Coronary artery disease involving native coronary artery of native heart without angina pectoris   • Weakness   • Peripheral vascular disease (CMS/HCC)   • Essential hypertension   • Mixed hyperlipidemia       Allergies   Allergen Reactions   • Bee Venom Anaphylaxis   • Carisoprodol  Shortness Of Breath, Unknown (See Comments) and Anaphylaxis   • Cefaclor Anaphylaxis   • Levaquin [Levofloxacin] Anaphylaxis   • Metoprolol Anaphylaxis and Shortness Of Breath   • Phenytoin Anaphylaxis   • Quinolones Anaphylaxis   • Dilantin [Phenytoin Sodium Extended]    • Lexiscan [Regadenoson] Other (See Comments)     Pain all over body   • Naproxen Swelling   • Phenobarbital Swelling   • Phenytoin Sodium Extended Itching   • Propoxyphene Unknown (See Comments)   • Toprol Xl [Metoprolol Tartrate]    • Ibuprofen Swelling and Rash       Family History   Problem Relation Age of Onset   • Heart disease Other    • Cancer Mother    • Alzheimer's disease Mother    • Heart disease Father        Social History     Socioeconomic History   • Marital status:      Spouse name: Not on file   • Number of children: Not on file   • Years of education: Not on file   • Highest education level: Not on file   Tobacco Use   • Smoking status: Current Every Day Smoker     Packs/day: 0.75     Types: Cigarettes   • Smokeless tobacco: Never Used   Substance and Sexual Activity   • Alcohol use: No   • Drug use: No   • Sexual activity: Defer         Current Outpatient Medications:   •  ALPRAZolam (XANAX) 0.5 MG tablet, Take 0.5 mg by mouth Daily As Needed for Anxiety., Disp: , Rfl:   •  aspirin 81 MG EC tablet, Take 1 tablet by mouth Daily., Disp: , Rfl:   •  cetirizine (zyrTEC) 10 MG tablet, Take 10 mg by mouth Daily., Disp: , Rfl:   •  Cholecalciferol (VITAMIN D3) 5000 units capsule capsule, Take 5,000 Units by mouth Daily., Disp: , Rfl:   •  gabapentin (NEURONTIN) 300 MG capsule, Take 300 mg by mouth 2 (Two) Times a Day., Disp: , Rfl:   •  guaiFENesin (MUCINEX) 600 MG 12 hr tablet, Take 2 tablets by mouth Every 12 (Twelve) Hours., Disp: , Rfl:   •  HYDROcodone-acetaminophen (NORCO)  MG per tablet, Take 1 tablet by mouth 2 (Two) Times a Day As Needed for Moderate Pain ., Disp: , Rfl:   •  irbesartan (AVAPRO) 300 MG tablet,  Take 300 mg by mouth Every Night., Disp: , Rfl:   •  montelukast (SINGULAIR) 10 MG tablet, , Disp: , Rfl:   •  nitroglycerin (NITROSTAT) 0.4 MG SL tablet, Place 0.4 mg under the tongue Every 5 (Five) Minutes As Needed for Chest Pain. Take no more than 3 doses in 15 minutes., Disp: , Rfl:   •  pantoprazole (PROTONIX) 40 MG EC tablet, Take 40 mg by mouth Every Morning Before Breakfast., Disp: , Rfl:   •  senna-docusate (PERICOLACE) 8.6-50 MG per tablet, Take 2 tablets by mouth Daily., Disp: , Rfl:   •  simvastatin (ZOCOR) 20 MG tablet, , Disp: , Rfl:   •  carvedilol (COREG) 12.5 MG tablet, Take 1 tablet by mouth 2 (Two) Times a Day With Meals., Disp: 60 tablet, Rfl: 11  •  QUEtiapine (SEROquel) 50 MG tablet, Take 50 mg by mouth Every Night., Disp: , Rfl:   •  Rivaroxaban (XARELTO) 2.5 MG tablet, Take 1 tablet by mouth Daily., Disp: 60 tablet, Rfl: 11    Past Surgical History:   Procedure Laterality Date   • CARDIAC CATHETERIZATION N/A 3/30/2018    Procedure: Left Heart Cath;  Surgeon: Evangelista Coley MD;  Location:  [facility];  Service: Cardiology   • CARDIAC SURGERY     • CORONARY STENT PLACEMENT     • HYSTERECTOMY     • LEG THROMBECTOMY/EMBOLECTOMY Left 3/30/2018    Procedure: LEFT LOWER EXTREMITY THROMBECTOMY/EMBOLECTOMY;  Surgeon: Nicolás Alexis DO;  Location:  [facility] OR;  Service: Vascular   • URETER SURGERY     • VASCULAR SURGERY      AAA Stent, multiple vascular stents and repairs       Review of Systems   Constitutional: Positive for fatigue. Negative for fever and unexpected weight change.   HENT: Positive for congestion.    Respiratory: Negative for apnea, chest tightness and shortness of breath.    Cardiovascular: Positive for leg swelling. Negative for chest pain and palpitations.   Gastrointestinal: Negative for abdominal pain and blood in stool.   Genitourinary: Negative for dysuria.   Musculoskeletal: Positive for arthralgias and back pain. Negative for myalgias.  "  Neurological: Negative for weakness and light-headedness.   Hematological: Bruises/bleeds easily.   Psychiatric/Behavioral: Positive for sleep disturbance.       /61   Pulse 93   Ht 156.2 cm (61.5\")   Wt 63.5 kg (140 lb)   BMI 26.02 kg/m²   Procedures    Objective   Physical Exam   Constitutional: She is oriented to person, place, and time. She appears well-developed and well-nourished. No distress.   Eyes: Pupils are equal, round, and reactive to light.   Neck: No thyromegaly present.   Cardiovascular: Normal rate and regular rhythm. Exam reveals distant heart sounds and decreased pulses. Exam reveals no gallop and no friction rub.   No murmur heard.  Pulmonary/Chest: Effort normal. No stridor. No respiratory distress. She has no wheezes. She has no rales.   Diffusely diminished   Abdominal: Soft. Bowel sounds are normal. She exhibits no distension and no mass. There is no tenderness. There is no guarding.   Musculoskeletal: She exhibits no tenderness or deformity.   Mild puffiness at ankles without pitting   Neurological: She is alert and oriented to person, place, and time.   Skin: Skin is warm and dry. She is not diaphoretic.   Psychiatric: She has a normal mood and affect.       Assessment/Plan   Zo was seen today for coronary artery disease, hypertension and hyperlipidemia.    Diagnoses and all orders for this visit:    Coronary artery disease involving native coronary artery of native heart without angina pectoris  Comments:  stabilized after recurrent IMI and RCA sujey 3/19  Orders:  -     ECG 12 Lead  -     Rivaroxaban (XARELTO) 2.5 MG tablet; Take 1 tablet by mouth Daily.    Essential hypertension  Comments:  controlled    Mixed hyperlipidemia  Comments:  on potent statin    PAD (peripheral artery disease) (CMS/[facility])  Comments:  no claudication now    Tobacco abuse  Comments:  #1 threat to recurrent atherothrombosis     Chronic bronchitis, unspecified chronic bronchitis type " (CMS/[facility])  Comments:  another reason to quit smoking                 Return in about 3 months (around 9/26/2019) for Next scheduled follow up with apc.  Orders Placed This Encounter   Procedures   • ECG 12 Lead     Order Specific Question:   Reason for Exam:     Answer:   [email or web address]

## 2019-06-27 ENCOUNTER — TELEPHONE (OUTPATIENT)
Dept: CARDIOLOGY | Facility: CLINIC | Age: 79
End: 2019-06-27

## 2019-06-27 NOTE — TELEPHONE ENCOUNTER
[facility] ([phone]) has called in today questioning patient's script for Xarelto. The sig is once a day but quantity is 60. Please clarify.

## 2019-08-01 ENCOUNTER — HOSPITAL ENCOUNTER (OUTPATIENT)
Dept: HOSPITAL 58 - CAR.REHAB | Age: 79
LOS: 30 days | End: 2019-08-31
Attending: INTERNAL MEDICINE
Payer: COMMERCIAL

## 2019-08-01 VITALS — BODY MASS INDEX: 26.2 KG/M2

## 2019-08-01 DIAGNOSIS — R07.9: Primary | ICD-10-CM

## 2019-08-01 DIAGNOSIS — Z95.5: ICD-10-CM

## 2019-08-01 PROCEDURE — 93798 PHYS/QHP OP CAR RHAB W/ECG: CPT

## 2019-08-21 VITALS — DIASTOLIC BLOOD PRESSURE: 58 MMHG | SYSTOLIC BLOOD PRESSURE: 138 MMHG

## 2019-09-03 ENCOUNTER — OFFICE VISIT (OUTPATIENT)
Dept: VASCULAR SURGERY | Facility: CLINIC | Age: 79
End: 2019-09-03

## 2019-09-03 ENCOUNTER — HOSPITAL ENCOUNTER (OUTPATIENT)
Dept: ULTRASOUND IMAGING | Facility: HOSPITAL | Age: 79
Discharge: HOME | End: 2019-09-03

## 2019-09-03 ENCOUNTER — HOSPITAL ENCOUNTER (OUTPATIENT)
Dept: ULTRASOUND IMAGING | Facility: HOSPITAL | Age: 79
Discharge: HOME | End: 2019-09-03
Admitting: NURSE PRACTITIONER

## 2019-09-03 VITALS
HEIGHT: 62 IN | DIASTOLIC BLOOD PRESSURE: 78 MMHG | WEIGHT: 142.6 LBS | OXYGEN SATURATION: 92 % | BODY MASS INDEX: 26.24 KG/M2 | SYSTOLIC BLOOD PRESSURE: 134 MMHG | HEART RATE: 96 BPM

## 2019-09-03 DIAGNOSIS — Z72.0 TOBACCO ABUSE: ICD-10-CM

## 2019-09-03 DIAGNOSIS — I71.20 THORACIC AORTIC ANEURYSM WITHOUT RUPTURE (HCC): ICD-10-CM

## 2019-09-03 DIAGNOSIS — I73.9 PAD (PERIPHERAL ARTERY DISEASE) (HCC): ICD-10-CM

## 2019-09-03 DIAGNOSIS — I71.40 ABDOMINAL AORTIC ANEURYSM (AAA) WITHOUT RUPTURE (HCC): ICD-10-CM

## 2019-09-03 DIAGNOSIS — E78.2 MIXED HYPERLIPIDEMIA: ICD-10-CM

## 2019-09-03 DIAGNOSIS — I65.23 BILATERAL CAROTID ARTERY STENOSIS: ICD-10-CM

## 2019-09-03 DIAGNOSIS — I10 ESSENTIAL HYPERTENSION: ICD-10-CM

## 2019-09-03 DIAGNOSIS — I73.9 PAD (PERIPHERAL ARTERY DISEASE) (HCC): Primary | ICD-10-CM

## 2019-09-03 PROCEDURE — 93880 EXTRACRANIAL BILAT STUDY: CPT

## 2019-09-03 PROCEDURE — 76775 US EXAM ABDO BACK WALL LIM: CPT

## 2019-09-03 PROCEDURE — 99214 OFFICE O/P EST MOD 30 MIN: CPT | Performed by: NURSE PRACTITIONER

## 2019-09-03 PROCEDURE — 93923 UPR/LXTR ART STDY 3+ LVLS: CPT

## 2019-09-03 PROCEDURE — 93925 LOWER EXTREMITY STUDY: CPT | Performed by: SURGERY

## 2019-09-03 PROCEDURE — 93880 EXTRACRANIAL BILAT STUDY: CPT | Performed by: SURGERY

## 2019-09-03 PROCEDURE — 93924 LWR XTR VASC STDY BILAT: CPT | Performed by: SURGERY

## 2019-09-03 PROCEDURE — 99406 BEHAV CHNG SMOKING 3-10 MIN: CPT | Performed by: NURSE PRACTITIONER

## 2019-09-03 NOTE — PROGRESS NOTES
9/3/2019       Kassi Acevedo MD  [address] [facility] [address]    Zo Stout  1940    Chief Complaint   Patient presents with   • Follow-up     6 Month Follow Up For Peripheral Artery Disease, Bilateral Carotid Artery Stenosis, and Abdominal Aortic Aneurysm without Rupture. Test 553608 US pad carotid bilateral, US pad lower ext arteries bilat, US pad aorta miramontes, and US pad ankle / brach ind ext comp. Patient denies any stroke like symptoms.    • other     Patient states her legs arent doing so good that she is just very weak. Patient states left leg hurts from the knee down.        Dear Kassi Acevedo MD        HPI  I had the pleasure of seeing your patient Zo Stout in the office today.  As you recall, Zo Stout is a 78 y.o.  female who was seen in the hospital with complaints of chest pain.  She underwent heart catheterization.  After the case was noted to have an acutely ischemic left lower extremity.  She has had multiple vascular interventions by physician in [place].  Dr. Alexis did take her to surgery that day for thrombectomy of the fem-fem bypass graft and her left femoropopliteal bypass.  She is doing well since the procedure.  I did review records from [place].  She is having complaints of weakness to her lower extremities, with existing nerve damage to left leg from previous surgeries.    She does have a history of chronic back pain as well.  She has had injections previously.  She states she has been having complaints of left flank pain and weakness.  Unfortunately, she does smoke about 1/2 pack per day.  She has been seen by urologist.  She is maintained on aspirin, Xarelto, and Zocor.  She did have noninvasive testing performed today, which I did review in office.      Review of Systems   Constitutional: Positive for fatigue.   HENT: Negative.    Eyes: Negative.    Respiratory: Positive for shortness of breath.    Cardiovascular: Negative.         Leg pain bilaterally, left  "slightly worse   Gastrointestinal: Negative.    Endocrine: Negative.    Genitourinary: Negative.    Musculoskeletal: Positive for back pain.   Skin: Negative.    Allergic/Immunologic: Negative.    Neurological: Positive for weakness.   Hematological: Negative.    Psychiatric/Behavioral: Negative.        /78 (BP Location: Right arm, Patient Position: Sitting, Cuff Size: Adult)   Pulse 96   Ht 156.2 cm (61.5\")   Wt 64.7 kg (142 lb 9.6 oz)   SpO2 92%   BMI 26.51 kg/m²   Physical Exam   Constitutional: She is oriented to person, place, and time. Vital signs are normal. She appears well-developed and well-nourished. No distress.   HENT:   Head: Normocephalic and atraumatic.   Mouth/Throat: Oropharynx is clear and moist.   Eyes: Pupils are equal, round, and reactive to light. No scleral icterus.   Neck: Normal range of motion. Neck supple. No JVD present. Carotid bruit is not present. No thyromegaly present.   Cardiovascular: Normal rate, regular rhythm, S2 normal, normal heart sounds, intact distal pulses and normal pulses. Exam reveals no gallop and no friction rub.   No murmur heard.  Pulses:       Femoral pulses are 2+ on the right side, and 2+ on the left side.       Dorsalis pedis pulses are 2+ on the right side, and 2+ on the left side.   Doppler signals PT bilaterally, palp DP   Pulmonary/Chest: Effort normal. No respiratory distress. She has decreased breath sounds.   Abdominal: Soft. Normal aorta and bowel sounds are normal. There is no hepatosplenomegaly.   Musculoskeletal: Normal range of motion. She exhibits edema (mild bilaterally).   Neurological: She is alert and oriented to person, place, and time. No cranial nerve deficit.   Skin: Skin is warm and dry. She is not diaphoretic.   Psychiatric: She has a normal mood and affect. Her behavior is normal. Judgment and thought content normal.   Nursing note and vitals reviewed.    Diagnostic Data:    Us Carotid Bilateral    Result Date: " 9/3/2019  Narrative: History: Carotid occlusive disease      Impression: Impression: 1. There is less than 50% stenosis of the right internal carotid artery. 2. There is less than 50% stenosis of the left internal carotid artery. 3. Antegrade flow is demonstrated in bilateral vertebral arteries.  Comments: Bilateral carotid vertebral arterial duplex scan was performed.  Grayscale imaging shows intimal thickening and calcified elements at the carotid bifurcation. The right internal carotid artery peak systolic velocity is 100 cm/sec. The end-diastolic velocity is 32.9 cm/sec. The right ICA/CCA ratio is approximately 1.36 . These findings correlate with less than 50% stenosis of the right internal carotid artery.  Grayscale imaging shows intimal thickening and calcified elements at the carotid bifurcation. The left internal carotid artery peak systolic velocity is 101 cm/sec. The end-diastolic velocity is 40.5 cm/sec. The left ICA/CCA ratio is approximately 1.5 . These findings correlate with less than 50% stenosis of the left internal carotid artery.  Antegrade flow is demonstrated in bilateral vertebral arteries.  This report was finalized on 09/03/2019 07:58 by Dr. Nicolás Alexis MD.    Us Ankle / Brachial Indices Extremity Complete    Result Date: 9/3/2019  Narrative:  History: PAD  Comments: Bilateral lower extremity arterial with multi-level pulse volume recordings and segmental pressures were performed at rest and stress.  The right ankle/brachial index is 1.31. The waveforms are triphasic without dampening.These findings are consistent with no significant arterial insufficiency of the right lower extremity at rest.  The left ankle/brachial index is 1.13. The waveforms are triphasic without dampening. These findings are consistent with no significant arterial insufficiency of the left lower extremity at rest.      Impression: Impression: 1. No significant arterial insufficiency of the right lower extremity at  rest. 2. No significant arterial insufficiency of the left lower extremity at rest.   This report was finalized on 09/03/2019 07:54 by Dr. Nicolás Alexis MD.    Us Aorta Limited    Result Date: 9/3/2019  Narrative: EXAM: US AORTA LIMITED- - 9/3/2019 7:50 AM CDT  HISTORY: AAA; I71.4-Abdominal aortic aneurysm, without rupture   COMPARISON: 02/18/2019.  TECHNIQUE: Real-time grayscale and color ultrasound performed with representative images saved to PACS.  FINDINGS:  Proximal aorta measures 2.9 x 3.4 cm. Previously 2.9 x 3. 1 cm on 02/18/2019. Mid aorta measures 3.8 x 3.9 cm. Previously 3.3 x 3.7 cm. Level of measurement appears slightly different on the 2 exams. Distal aorta measures 3.8 x 3.7 cm. Previously 3.8 x 3.6 cm.  Right common iliac measures 1.3 cm. Previously 1.2 cm. Left common iliac measures 1.4 cm. Previously 1.3 cm.       Impression: 1. Interval increased size of aorta by ultrasound. This could represent a true enlargement or differences in technique.  This report was finalized on 09/03/2019 09:06 by Dr Evelin Newton MD.    Us Arterial Doppler Lower Extremity Bilateral    Result Date: 9/3/2019  Narrative:  History: PAD.  Comments: Grayscale imaging as well as color flow duplex were used to evaluate the lower extremity right to left femorofemoral bypass graft and left femoropopliteal bypass graft.  The peak systolic velocity in the right groin anastomosis measured 170 cm/s. In the proximal graft measured 97.8 cm/s. In the mid graft measured 117 cm/s. In the distal graft measured 102 cm/s. At the distal anastomosis measured 43.6 cm/s.  In the right common femoral artery measured 160 cm/s. In the profunda femoris artery measured 67.9 cm/s. In the proximal SFA measured 115 cm/s. In the mid SFA measured 126 cm/s. In the distal SFA measured 123 cm/s. In the popliteal artery measured 61.6 cm/s. In the posterior tibial artery measured 47.5 cm/s. In the anterior tibial artery measured 64.1 cm/s.  In the left  femoropopliteal bypass graft the peak systolic velocity at the proximal anastomosis measured 124 cm/s. In the proximal graft measured 62.8 cm/s. In the mid graft measured 62.8 cm/s. In the distal graft measured 51.9 cm/s. At the distal anastomosis measured 96.2 cm/s. In the popliteal artery measured 42.5 cm/s. In the posterior tibial artery measured 51.1 cm/s. In the anterior tibial artery measured 69.9 cm/s.      Impression: Patent right to left femorofemoral bypass graft as well as patent left femoropopliteal bypass graft without evidence of significant stenosis.   This report was finalized on 09/03/2019 07:57 by Dr. Nicolás Alexis MD.      Patient Active Problem List   Diagnosis   • ST elevation myocardial infarction involving right coronary artery (CMS/HCC)   • PAD (peripheral artery disease) (CMS/[facility])   • Chronic bronchitis (CMS/[facility])   • Tobacco consumption   • Tobacco abuse   • Coronary artery disease involving native coronary artery of native heart without angina pectoris   • Weakness   • Peripheral vascular disease (CMS/[facility])   • Essential hypertension   • Mixed hyperlipidemia         ICD-10-CM ICD-9-CM   1. PAD (peripheral artery disease) (CMS/[facility]) I73.9 443.9   2. Abdominal aortic aneurysm (AAA) without rupture (CMS/[facility]) I71.4 441.4   3. Thoracic aortic aneurysm without rupture (CMS/[facility]) I71.2 441.2   4. Tobacco abuse Z72.0 305.1   5. Essential hypertension I10 401.9   6. Mixed hyperlipidemia E78.2 272.2       Plan: After thoroughly evaluating Zo Stout, I believe the best course of action is to remian conservative from a vascular standpoint.  I did review her testing and remains stable.  She has palpable DP bilaterally and doppler PT signals.  We will see her back in 6 months with repeat noninvasive testing including a CT of the chest abdomen and pelvis without contrast due to her solitary kidney and a duplex of her left lower extremity bypass.  She needs to continue her aspirin, Xarelto, and  Zocor..  I did discuss vascular risk factors as they pertain to the progression of vascular disease including controlling her hypertension, hyperlipidemia, and smoking cessation.  Her hypertension and hyperlipidemia appears stable on her current medication regimen.  I advised the patient of the risks in continuing to use tobacco, and I provided this patient with smoking cessation educational materials.  During this visit, I spent > 3-10 minutes counseling the patient regarding smoking cessation.  Body mass index is 26.51 kg/m².    The patient can continue taking her current medication regimen as previously planned.  This was all discussed in full with complete understanding.    Thank you for allowing me to participate in the care of your patient.  Please do not hesitate with any questions or concerns.  I will keep you aware of any further encounters with Zo Stout.        Sincerely yours,         RUPERT Munoz Gemo Y, MD

## 2020-01-01 ENCOUNTER — APPOINTMENT (OUTPATIENT)
Dept: GENERAL RADIOLOGY | Facility: HOSPITAL | Age: 80
End: 2020-01-01

## 2020-01-01 ENCOUNTER — TRANSCRIBE ORDERS (OUTPATIENT)
Dept: ADMINISTRATIVE | Facility: HOSPITAL | Age: 80
End: 2020-01-01

## 2020-01-01 ENCOUNTER — HOSPITAL ENCOUNTER (OUTPATIENT)
Dept: ULTRASOUND IMAGING | Facility: HOSPITAL | Age: 80
Discharge: HOME | End: 2020-08-11
Admitting: SURGERY

## 2020-01-01 ENCOUNTER — HOSPITAL ENCOUNTER (OUTPATIENT)
Dept: ULTRASOUND IMAGING | Facility: HOSPITAL | Age: 80
Discharge: HOME | End: 2020-07-28
Admitting: GENERAL PRACTICE

## 2020-01-01 ENCOUNTER — HOSPITAL ENCOUNTER (OUTPATIENT)
Dept: CT IMAGING | Facility: HOSPITAL | Age: 80
Discharge: HOME | End: 2020-05-20
Admitting: GENERAL PRACTICE

## 2020-01-01 ENCOUNTER — OFFICE VISIT (OUTPATIENT)
Dept: VASCULAR SURGERY | Facility: CLINIC | Age: 80
End: 2020-01-01

## 2020-01-01 ENCOUNTER — HOSPITAL ENCOUNTER (OUTPATIENT)
Dept: ULTRASOUND IMAGING | Facility: HOSPITAL | Age: 80
Discharge: HOME | End: 2020-08-11

## 2020-01-01 ENCOUNTER — TELEPHONE (OUTPATIENT)
Dept: VASCULAR SURGERY | Facility: CLINIC | Age: 80
End: 2020-01-01

## 2020-01-01 ENCOUNTER — APPOINTMENT (OUTPATIENT)
Dept: ULTRASOUND IMAGING | Facility: HOSPITAL | Age: 80
End: 2020-01-01

## 2020-01-01 ENCOUNTER — TRANSCRIBE ORDERS (OUTPATIENT)
Dept: GENERAL RADIOLOGY | Facility: HOSPITAL | Age: 80
End: 2020-01-01

## 2020-01-01 VITALS
BODY MASS INDEX: 27.38 KG/M2 | OXYGEN SATURATION: 96 % | HEART RATE: 73 BPM | SYSTOLIC BLOOD PRESSURE: 122 MMHG | DIASTOLIC BLOOD PRESSURE: 82 MMHG | WEIGHT: 145 LBS | HEIGHT: 61 IN

## 2020-01-01 VITALS
DIASTOLIC BLOOD PRESSURE: 84 MMHG | HEIGHT: 62 IN | HEART RATE: 68 BPM | OXYGEN SATURATION: 94 % | WEIGHT: 145 LBS | BODY MASS INDEX: 26.68 KG/M2 | SYSTOLIC BLOOD PRESSURE: 130 MMHG

## 2020-01-01 DIAGNOSIS — I10 ESSENTIAL HYPERTENSION: ICD-10-CM

## 2020-01-01 DIAGNOSIS — Z95.828 S/P FEMORAL-FEMORAL BYPASS SURGERY: ICD-10-CM

## 2020-01-01 DIAGNOSIS — Z72.0 TOBACCO ABUSE: ICD-10-CM

## 2020-01-01 DIAGNOSIS — T82.898A: Primary | ICD-10-CM

## 2020-01-01 DIAGNOSIS — R10.9 LEFT FLANK PAIN: Primary | ICD-10-CM

## 2020-01-01 DIAGNOSIS — I73.9 PAD (PERIPHERAL ARTERY DISEASE) (HCC): Primary | ICD-10-CM

## 2020-01-01 DIAGNOSIS — I73.9 PAD (PERIPHERAL ARTERY DISEASE) (HCC): ICD-10-CM

## 2020-01-01 DIAGNOSIS — R06.02 SHORTNESS OF BREATH: Primary | ICD-10-CM

## 2020-01-01 DIAGNOSIS — I71.40 ABDOMINAL AORTIC ANEURYSM (AAA) WITHOUT RUPTURE (HCC): ICD-10-CM

## 2020-01-01 DIAGNOSIS — I65.23 BILATERAL CAROTID ARTERY STENOSIS: ICD-10-CM

## 2020-01-01 DIAGNOSIS — E78.2 MIXED HYPERLIPIDEMIA: ICD-10-CM

## 2020-01-01 DIAGNOSIS — R10.9 LEFT FLANK PAIN: ICD-10-CM

## 2020-01-01 DIAGNOSIS — Z87.09 HISTORY OF COPD: Primary | ICD-10-CM

## 2020-01-01 DIAGNOSIS — M79.89 LIMB SWELLING: Primary | ICD-10-CM

## 2020-01-01 DIAGNOSIS — Z95.828 S/P FEMOROPOPLITEAL BYPASS SURGERY: ICD-10-CM

## 2020-01-01 LAB — CREAT BLDA-MCNC: 0.9 MG/DL (ref 0.6–1.3)

## 2020-01-01 PROCEDURE — 93880 EXTRACRANIAL BILAT STUDY: CPT | Performed by: SURGERY

## 2020-01-01 PROCEDURE — 93880 EXTRACRANIAL BILAT STUDY: CPT

## 2020-01-01 PROCEDURE — 93925 LOWER EXTREMITY STUDY: CPT

## 2020-01-01 PROCEDURE — 99214 OFFICE O/P EST MOD 30 MIN: CPT | Performed by: SURGERY

## 2020-01-01 PROCEDURE — 93923 UPR/LXTR ART STDY 3+ LVLS: CPT | Performed by: SURGERY

## 2020-01-01 PROCEDURE — 25010000002 IOPAMIDOL 61 % SOLUTION: Performed by: GENERAL PRACTICE

## 2020-01-01 PROCEDURE — 82565 ASSAY OF CREATININE: CPT

## 2020-01-01 PROCEDURE — 93925 LOWER EXTREMITY STUDY: CPT | Performed by: SURGERY

## 2020-01-01 PROCEDURE — 93970 EXTREMITY STUDY: CPT

## 2020-01-01 PROCEDURE — 93923 UPR/LXTR ART STDY 3+ LVLS: CPT

## 2020-01-01 PROCEDURE — 74177 CT ABD & PELVIS W/CONTRAST: CPT

## 2020-01-01 RX ORDER — ATORVASTATIN CALCIUM 40 MG/1
40 TABLET, FILM COATED ORAL DAILY
COMMUNITY
Start: 2020-01-01

## 2020-01-01 RX ORDER — CYCLOBENZAPRINE HCL 5 MG
5 TABLET ORAL TAKE AS DIRECTED
COMMUNITY
Start: 2020-01-01

## 2020-01-01 RX ADMIN — IOPAMIDOL 100 ML: 612 INJECTION, SOLUTION INTRAVENOUS at 10:33

## 2020-01-01 RX ADMIN — IOPAMIDOL 50 ML: 612 INJECTION, SOLUTION INTRAVENOUS at 10:33

## 2020-01-03 DIAGNOSIS — I25.10 CORONARY ARTERY DISEASE INVOLVING NATIVE CORONARY ARTERY OF NATIVE HEART WITHOUT ANGINA PECTORIS: ICD-10-CM

## 2020-02-28 ENCOUNTER — HOSPITAL ENCOUNTER (OUTPATIENT)
Dept: CT IMAGING | Facility: HOSPITAL | Age: 80
Discharge: HOME | End: 2020-02-28

## 2020-02-28 ENCOUNTER — HOSPITAL ENCOUNTER (OUTPATIENT)
Dept: ULTRASOUND IMAGING | Facility: HOSPITAL | Age: 80
Discharge: HOME | End: 2020-02-28
Admitting: NURSE PRACTITIONER

## 2020-02-28 DIAGNOSIS — I71.40 ABDOMINAL AORTIC ANEURYSM (AAA) WITHOUT RUPTURE (HCC): ICD-10-CM

## 2020-02-28 DIAGNOSIS — I73.9 PAD (PERIPHERAL ARTERY DISEASE) (HCC): ICD-10-CM

## 2020-02-28 DIAGNOSIS — I71.20 THORACIC AORTIC ANEURYSM WITHOUT RUPTURE (HCC): ICD-10-CM

## 2020-02-28 PROCEDURE — 71250 CT THORAX DX C-: CPT

## 2020-02-28 PROCEDURE — 93926 LOWER EXTREMITY STUDY: CPT

## 2020-02-28 PROCEDURE — 74176 CT ABD & PELVIS W/O CONTRAST: CPT

## 2020-02-28 PROCEDURE — 93926 LOWER EXTREMITY STUDY: CPT | Performed by: SURGERY

## 2020-03-04 ENCOUNTER — TELEPHONE (OUTPATIENT)
Dept: VASCULAR SURGERY | Facility: CLINIC | Age: 80
End: 2020-03-04

## 2020-03-05 ENCOUNTER — OFFICE VISIT (OUTPATIENT)
Dept: VASCULAR SURGERY | Facility: CLINIC | Age: 80
End: 2020-03-05

## 2020-03-05 VITALS
BODY MASS INDEX: 27.75 KG/M2 | HEART RATE: 68 BPM | HEIGHT: 61 IN | SYSTOLIC BLOOD PRESSURE: 132 MMHG | DIASTOLIC BLOOD PRESSURE: 84 MMHG | OXYGEN SATURATION: 95 % | WEIGHT: 147 LBS

## 2020-03-05 DIAGNOSIS — I10 ESSENTIAL HYPERTENSION: ICD-10-CM

## 2020-03-05 DIAGNOSIS — I65.23 BILATERAL CAROTID ARTERY STENOSIS: ICD-10-CM

## 2020-03-05 DIAGNOSIS — Z72.0 TOBACCO ABUSE: ICD-10-CM

## 2020-03-05 DIAGNOSIS — Z95.828 S/P FEMORAL-FEMORAL BYPASS SURGERY: ICD-10-CM

## 2020-03-05 DIAGNOSIS — Z95.828 S/P FEMOROPOPLITEAL BYPASS SURGERY: ICD-10-CM

## 2020-03-05 DIAGNOSIS — I73.9 PAD (PERIPHERAL ARTERY DISEASE) (HCC): Primary | ICD-10-CM

## 2020-03-05 DIAGNOSIS — E78.2 MIXED HYPERLIPIDEMIA: ICD-10-CM

## 2020-03-05 PROCEDURE — 99214 OFFICE O/P EST MOD 30 MIN: CPT | Performed by: SURGERY

## 2020-03-05 RX ORDER — MONTELUKAST SODIUM 10 MG/1
10 TABLET ORAL NIGHTLY
COMMUNITY

## 2020-03-05 RX ORDER — SACUBITRIL AND VALSARTAN 97; 103 MG/1; MG/1
TABLET, FILM COATED ORAL
COMMUNITY
Start: 2020-01-13 | End: 2020-01-01

## 2020-03-05 NOTE — PROGRESS NOTES
3/5/2020       Kassi Acevedo MD  [address] [address] [facility] [address]    Zo Stout  1940    Chief Complaint   Patient presents with   • Follow-up     6 Month Follow Up For Peripheral Artery Disease, Abdominal Aortic Aneurysm without Rupture, Thoracic Aortic Aneurysm without Rupture. Test [id number] CT PAD ABD PELVIS WO CONTRAST, and US pad lower ext arteries left. Patient denies any stroke like symptoms.        Dear Kassi Acevedo MD        HPI  I had the pleasure of seeing your patient Zo Stout in the office today.  As you recall, Zo Stout is a 79 y.o.  female who was seen in the hospital with complaints of chest pain.  She underwent heart catheterization.  After the case was noted to have an acutely ischemic left lower extremity.  She has had multiple vascular interventions by physician in [place].  Dr. Alexis did take her to surgery that day for thrombectomy of the fem-fem bypass graft and her left femoropopliteal bypass.  She is doing well since the procedure.  I did review records from [place].  She is having complaints of weakness to her lower extremities, with existing nerve damage to left leg from previous surgeries.    She does have a history of chronic back pain as well.  She has had injections previously.  She states she has been having complaints of left flank pain and weakness.  Unfortunately, she does smoke about 1/2 pack per day.  She has been seen by urologist.  She is maintained on aspirin, Xarelto, and Zocor.  She did have noninvasive testing performed today, which I did review in office.      Review of Systems   Constitutional: Positive for fatigue.   HENT: Negative.    Eyes: Negative.    Respiratory: Positive for shortness of breath.    Cardiovascular: Negative.         Leg pain bilaterally, left slightly worse   Gastrointestinal: Negative.    Endocrine: Negative.    Genitourinary: Negative.    Musculoskeletal: Positive for back pain.   Skin: Negative.   "  Allergic/Immunologic: Negative.    Neurological: Positive for weakness.   Hematological: Negative.    Psychiatric/Behavioral: Negative.        /84 (BP Location: Right arm, Patient Position: Sitting, Cuff Size: Adult)   Pulse 68   Ht 154.9 cm (61\")   Wt 66.7 kg (147 lb)   SpO2 95%   BMI 27.78 kg/m²   Physical Exam   Constitutional: She is oriented to person, place, and time. Vital signs are normal. She appears well-developed and well-nourished. No distress.   HENT:   Head: Normocephalic and atraumatic.   Mouth/Throat: Oropharynx is clear and moist.   Eyes: Pupils are equal, round, and reactive to light. No scleral icterus.   Neck: Normal range of motion. Neck supple. No JVD present. Carotid bruit is not present. No thyromegaly present.   Cardiovascular: Normal rate, regular rhythm, S2 normal, normal heart sounds, intact distal pulses and normal pulses. Exam reveals no gallop and no friction rub.   No murmur heard.  Pulses:       Femoral pulses are 2+ on the right side, and 2+ on the left side.       Dorsalis pedis pulses are 2+ on the right side, and 2+ on the left side.   Doppler signals PT bilaterally, palp DP   Pulmonary/Chest: Effort normal. No respiratory distress. She has decreased breath sounds.   Abdominal: Soft. Normal aorta and bowel sounds are normal. There is no hepatosplenomegaly.   Musculoskeletal: Normal range of motion. She exhibits edema (mild bilaterally).   Neurological: She is alert and oriented to person, place, and time. No cranial nerve deficit.   Skin: Skin is warm and dry. She is not diaphoretic.   Psychiatric: She has a normal mood and affect. Her behavior is normal. Judgment and thought content normal.   Nursing note and vitals reviewed.    Diagnostic Data:    Ct Abdomen Pelvis Without Contrast    Result Date: 2/28/2020  Narrative: EXAMINATION: CT ABDOMEN PELVIS WO CONTRAST- 2/28/2020 1:19 PM CST  HISTORY: Known abdominal aortic aneurysm  COMPARISON: CT abdomen and pelvis " without contrast 7/10/2017 and 4/29/2017  DOSE: 288 mGy-cm  TECHNIQUE: Sequential imaging was performed from the lung bases through the pubic symphysis without the use of IV contrast.  Sagittal and coronal reformations were made from the original source data and reviewed. Automated exposure control was also utilized to decrease patient radiation dose.  FINDINGS: The visualized heart appears normal in size. Coronary artery calcifications are present. There is minimal consolidation of the right lung base, favored to represent atelectasis. See the separate CT chest report for details.  Evaluation is limited by lack of IV contrast. Allowing for these limitations, the liver, gallbladder, spleen, pancreas, and adrenal glands are unremarkable. The right kidney is markedly atrophic with multiple hypodense lesions noted, incompletely characterized on this exam. The left kidney has a normal noncontrast appearance.  There has been previous aortobiiliac endovascular stent grafting. Infrarenal abdominal aorta measures 4.0 cm in diameter, previously measuring 4.3 cm in diameter. There is dilatation at the origin of the renal arteries bilaterally, also similar to the prior exam. A femorofemoral bypass graft is also evident.  No pathologically enlarged central mesenteric or retroperitoneal lymph nodes are identified.  The stomach and small bowel appear normal in caliber. There are a few scattered colonic diverticula without evidence of acute diverticulitis. Large bowel otherwise appears grossly normal. No free air or free fluid is seen in the abdomen.  There appears to be a right lateral urinary bladder diverticulum. No free fluid is seen in the pelvis. No pelvic or inguinal lymphadenopathy is identified. There is scarring in the inguinal regions bilaterally.  Review of the visualized osseous structures demonstrates no acute or aggressive lesions.      Impression: 1. Slight interval decrease in the size of a stented infrarenal  abdominal aortic aneurysm. 2. Marked right renal atrophy with multiple hypodense lesions, incompletely characterized on this exam. 3. Atherosclerotic disease. 4. Colonic diverticulosis without evidence of acute diverticulitis. 5. Small right lateral urinary bladder diverticulum.  Please see the separate CT chest report from the same day for findings in the lung bases.    This report was finalized on 02/28/2020 13:26 by Dr. Mehrdad Johnson MD.    Ct Chest Without Contrast    Result Date: 2/28/2020  Narrative: EXAMINATION:  CT CHEST WO CONTRAST-  2/28/2020 11:56 AM CST  HISTORY: Thoracic aortic aneurysm (TAA), known, follow up; I71.2-Thoracic aortic aneurysm, without rupture  COMPARISON: 10/29/2018 chest CT  TECHNIQUE: Radiation dose equals  mGy-cm.  Automated exposure control dose reduction technique was implemented.  Thin section axial imaging was obtained without intravenous contrast. 2-D sagittal and coronal reconstruction images were generated.  FINDINGS:  There are calcified mediastinal and hilar lymph nodes. There are no pathologically enlarged nodes observed on this nonenhanced examination.  There is atherosclerotic aortic calcifications. There are coronary artery calcifications.  There is ectasia of the distal thoracic aorta with aneurysmal dilatation of the suprarenal abdominal aorta, imaged incompletely.  There are scattered calcified granuloma.  There is airspace opacities appreciated in the right upper lobe, anteriorly adjacent to the major fissure extending from the base medially towards the hilum, not observed on the previous study. Significance uncertain, as acute infectious or inflammatory process/pneumonia not excluded. Correlate with patient presentation. Follow-up suggested.  There is occasional small nodular opacities stable. There are no suspicious pulmonary nodules or masses.  There are no pleural effusions or pneumothoraces.  Limited imaging the upper abdomen included on this examination  discussed in detail on the abdomen pelvis CT to follow.  Spondylosis changes identified in the thoracic spine with osteophyte formation. T4 vertebral body hemangioma observed.  No acute osseous abnormalities identified.      Impression: 1. Mild airspace opacities appreciated in the right lower lobe, anteriorly near the major fissure not observed previously. Significance uncertain. Infectious/inflammatory change, pneumonia not excluded. Correlate with patient presentation, follow-up recommended. 2. Otherwise, stable CT appearance the chest. This report was finalized on 02/28/2020 13:27 by Dr. Alec Beasley MD.    Us Arterial Doppler Lower Extremity Left    Result Date: 3/3/2020  Narrative:  History: PAD  Comments: Left lower extremity arterial duplex was performed using grayscale imaging and color flow Doppler.  FINDINGS: The left common femoral and profunda arteries were not imaged in this study. The proximal anastomosis of the left femoral to popliteal artery bypass appears patent with a peak systolic velocity 143 cm/s. Proximal graft is patent with a peak systolic velocity of 83.4 cm/s. The mid graft is patent with a peak systolic velocity of 66.2 cm/s. The distal graft is patent with a peak systolic velocity of 57.6 cm/s. The distal anastomosis is patent with a peak systolic velocity of 49.0 cm/s. The native popliteal artery is patent with a peak systolic velocity of 56.7 cm/s. The distal posterior tibial artery is patent with a peak systolic velocity of 66.2 cm/s. The proximal anterior tibial artery is patent with a peak systolic velocity of 78.2 cm/s.      Impression: Patent left lower extremity femoral to popliteal artery bypass with no evidence of significant stenosis or occlusion. This report was finalized on 03/03/2020 09:20 by Dr. Victor Manuel Connor MD.      Patient Active Problem List   Diagnosis   • ST elevation myocardial infarction involving right coronary artery (CMS/HCC)   • PAD (peripheral artery  disease) (CMS/[facility])   • Chronic bronchitis (CMS/[facility])   • Tobacco consumption   • Tobacco abuse   • Coronary artery disease involving native coronary artery of native heart without angina pectoris   • Weakness   • Peripheral vascular disease (CMS/[facility])   • Essential hypertension   • Mixed hyperlipidemia         ICD-10-CM ICD-9-CM   1. PAD (peripheral artery disease) (CMS/[facility]) I73.9 443.9   2. S/P femoropopliteal bypass surgery Z95.828 V45.89   3. S/P femoral-femoral bypass surgery Z95.828 V45.89   4. Bilateral carotid artery stenosis I65.23 433.10     433.30   5. Tobacco abuse Z72.0 305.1   6. Essential hypertension I10 401.9   7. Mixed hyperlipidemia E78.2 272.2       Plan: After thoroughly evaluating Zo MARCIE Stout, I believe the best course of action is to remian conservative from a vascular standpoint.  I did review her testing and remains stable.  She has palpable DP bilaterally and doppler PT signals.  We will see her back in 6 months with repeat noninvasive testing including a duplex of her femorofemoral bypass graft and left lower extremity bypass.  She appears to be under Xarelto, Brilinta, and aspirin.  We will ask cardiology to address possibly removing the aspirin.  I will recheck her aneurysms in 1 year.  I did discuss vascular risk factors as they pertain to the progression of vascular disease including controlling her hypertension, hyperlipidemia, and smoking cessation.  Her hypertension and hyperlipidemia appears stable on her current medication regimen.  I advised the patient of the risks in continuing to use tobacco, and I provided this patient with smoking cessation educational materials.  During this visit, I spent > 3-10 minutes counseling the patient regarding smoking cessation.  Body mass index is 27.78 kg/m².    The patient can continue taking her current medication regimen as previously planned.  This was all discussed in full with complete understanding.    Thank you for allowing me to  participate in the care of your patient.  Please do not hesitate with any questions or concerns.  I will keep you aware of any further encounters with Zo Stout.        Sincerely yours,         DO Curtis Deluca Gemo Y, MD

## 2020-08-10 NOTE — TELEPHONE ENCOUNTER
Spoke with Ms Stout reminding her of her appointments for Tuesday, August 11th, 2020. [facility] and follow up afterwards at 230 pm with Dr Alexis. Ms Girish confirmed she would be here.

## 2020-08-11 NOTE — PROGRESS NOTES
8/11/2020       Kassi Acevedo MD  [address] [address] [facility] [address]    Zo Stout  1940    Chief Complaint   Patient presents with   • Follow-up     6 Month Follow Up For Peripheral Artery Disease, Bilateral Carotid Artery Stenosis and S/P femoral-femoral bypass surgery. Test [id number] US pad ankle / brach ind ext comp, US pad lower ext arteries bilat, and US pad carotid bilateral. Patient denies any stroke like symptoms.    • other     Patient states Left leg feels heavy, tired, and wants to stop working. Patient states her left leg is clogged up again. Patient states not on a blood thinner and she isnt sure how long she has been off of it.    • Med Management     Verbally verified medications with patient. Ms Stout verbalized all medications are correct and up to date.        Dear Kassi Acevedo MD        HPI  I had the pleasure of seeing your patient Zo Stout in the office today.  As you recall, Zo Stout is a 79 y.o.  female who was seen in the hospital with complaints of chest pain.  She underwent heart catheterization.  After the case was noted to have an acutely ischemic left lower extremity.  She has had multiple vascular interventions by physician in [place].  Dr. Alexis did take her to surgery that day for thrombectomy of the fem-fem bypass graft and her left femoropopliteal bypass.  She is doing well since the procedure.  I did review records from [place].  She is having complaints of weakness to her lower extremities, with existing nerve damage to left leg from previous surgeries.    She does have a history of chronic back pain as well.  She has had injections previously.  She states she has been having complaints of left flank pain and weakness.  Unfortunately, she does smoke about 1/2 pack per day.  She has been seen by urologist.  She is maintained on aspirin, Xarelto, and Zocor.  She did have noninvasive testing performed today, which I did review in office. She has been  "having right leg swelling for 3 months.  She states her left leg is useless, she has been off xarelto but is not sure how long,       Review of Systems   Constitutional: Positive for fatigue.   HENT: Negative.    Eyes: Negative.    Respiratory: Positive for shortness of breath.    Cardiovascular: Negative.         Leg pain bilaterally, left slightly worse   Gastrointestinal: Negative.    Endocrine: Negative.    Genitourinary: Negative.    Musculoskeletal: Positive for back pain.   Skin: Negative.    Allergic/Immunologic: Negative.    Neurological: Positive for weakness.   Hematological: Negative.    Psychiatric/Behavioral: Negative.        /84 (BP Location: Right arm, Patient Position: Sitting, Cuff Size: Adult)   Pulse 68   Ht 156.2 cm (61.5\")   Wt 65.8 kg (145 lb)   SpO2 94%   BMI 26.95 kg/m²   Physical Exam   Constitutional: She is oriented to person, place, and time. Vital signs are normal. She appears well-developed and well-nourished. No distress.   HENT:   Head: Normocephalic and atraumatic.   Mouth/Throat: Oropharynx is clear and moist.   Eyes: Pupils are equal, round, and reactive to light. No scleral icterus.   Neck: Normal range of motion. Neck supple. No JVD present. Carotid bruit is not present. No thyromegaly present.   Cardiovascular: Normal rate, regular rhythm, S2 normal, normal heart sounds and normal pulses. Exam reveals no gallop and no friction rub.   No murmur heard.  Pulses:       Femoral pulses are 2+ on the right side.       Dorsalis pedis pulses are 2+ on the right side.   Right palpable pulses, left occluded bypass and femfem   Pulmonary/Chest: Effort normal. No respiratory distress. She has decreased breath sounds.   Abdominal: Soft. Normal aorta and bowel sounds are normal. There is no hepatosplenomegaly.   Musculoskeletal: Normal range of motion. She exhibits edema (right 2+).   Neurological: She is alert and oriented to person, place, and time. No cranial nerve deficit. "   Skin: Skin is warm and dry. She is not diaphoretic.   Psychiatric: She has a normal mood and affect. Her behavior is normal. Judgment and thought content normal.   Nursing note and vitals reviewed.    Diagnostic Data:    Us Carotid Bilateral    Result Date: 8/11/2020  Narrative: History: Carotid occlusive disease      Impression: Impression: 1. There is less than 50% stenosis of the right internal carotid artery. 2. There is less than 50% stenosis of the left internal carotid artery. 3. Antegrade flow is demonstrated in bilateral vertebral arteries.  Comments: Bilateral carotid vertebral arterial duplex scan was performed.  Grayscale imaging shows intimal thickening and calcified elements at the carotid bifurcation. The right internal carotid artery peak systolic velocity is 53.9 cm/sec. The end-diastolic velocity is 19.3 cm/sec. The right ICA/CCA ratio is approximately 0.72 . These findings correlate with less than 50% stenosis of the right internal carotid artery.  Grayscale imaging shows intimal thickening and calcified elements at the carotid bifurcation. The left internal carotid artery peak systolic velocity is 79.2 cm/sec. The end-diastolic velocity is 24 cm/sec. The left ICA/CCA ratio is approximately 1.29 . These findings correlate with less than 50% stenosis of the left internal carotid artery.  Antegrade flow is demonstrated in bilateral vertebral arteries.  This report was finalized on 08/11/2020 16:20 by Dr. Nicolás Alexis MD.    Us Ankle / Brachial Indices Extremity Complete    Result Date: 8/11/2020  Narrative:  History: PAD  Comments: Bilateral lower extremity arterial with multi-level pulse volume recordings and segmental pressures were performed at rest and stress.  The right ankle/brachial index is 1.12. The waveforms are triphasic without dampening.These findings are consistent with no significant arterial insufficiency of the right lower extremity at rest.  The left ankle/brachial index is  not obtainable due to noncompressibility of the vessels. The waveforms are monophasic and flat lined. These findings are consistent with severe arterial insufficiency of the left lower extremity at rest.      Impression: Impression: 1. No significant arterial insufficiency of the right lower extremity at rest. 2. Severe arterial insufficiency of the left lower extremity at rest.   This report was finalized on 08/11/2020 16:07 by Dr. Nicolás Alexis MD.    Us Arterial Doppler Lower Extremity Complete    Result Date: 8/11/2020  Narrative:  History: PAD  Comments: Gray scale imaging as well as color flow duplex were used to evaluate the right-to-left femorofemoral bypass graft as well as the left femoropopliteal bypass graft.  The peak systolic velocity of the femorofemoral bypass graft at the proximal anastomosis in the right groin measured 31.4 cm/s. In the proximal graft measured 14.7 cm/s. The mid and distal graft is occluded. The left femoropopliteal bypass graft is also occluded.  The peak systolic velocity in the right common femoral artery measured 301 cm/s. In the proximal SFA measured 155 cm/s. In the mid SFA measured 155 cm/s. In the distal SFA measured 146 cm/s. In the popliteal artery measured 63.3 cm/s. In the posterior tibial artery measured 58.5 cm/s. In the anterior tibial artery measured 67.6 cm/s.  In the left lower extremity the peak systolic velocity in the mid SFA measured 31.9 cm/s. In the popliteal artery measured 14 cm/s. In the posterior tibial artery measured 11.2 cm/s. In the anterior tibial artery measured 12.4 cm/s.      Impression: Occluded right to left femorofemoral bypass graft as well as an occluded left femoropopliteal bypass graft.    This report was finalized on 08/11/2020 16:10 by Dr. Nicolás Alexis MD.    Us Venous Doppler Lower Extremity Bilateral (duplex)    Result Date: 7/28/2020  Narrative: US VENOUS DOPPLER LOWER EXTREMITY BILATERAL (DUPLEX)- 7/28/2020 1:44 PM CDT   HISTORY: M79.89; M79.89-Other specified soft tissue disorders  COMPARISON: NONE  FINDINGS: Transverse and longitudinal grayscale and Doppler sonographic images of bilateral lower extremities were obtained.  The common femoral, superficial femoral and popliteal veins are all compressible and demonstrate normal augmentation and color flow. There is normal flow and compressibility of bilateral anterior tibial, and posterior tibial veins. Right greater saphenous vein has been removed.       Impression: 1. Duplex ultrasound of bilateral lower extremities without evidence of DVT.  This report was finalized on 07/28/2020 14:13 by Dr. Leena Garcia MD.        Patient Active Problem List   Diagnosis   • ST elevation myocardial infarction involving right coronary artery (CMS/[facility])   • PAD (peripheral artery disease) (CMS/[facility])   • Chronic bronchitis (CMS/[facility])   • Tobacco consumption   • Tobacco abuse   • Coronary artery disease involving native coronary artery of native heart without angina pectoris   • Weakness   • Peripheral vascular disease (CMS/[facility])   • Essential hypertension   • Mixed hyperlipidemia         ICD-10-CM ICD-9-CM   1. Occlusion of left femoropopliteal bypass graft, initial encounter (CMS/[facility]) T82.898A 996.74   2. S/P femoral-femoral bypass surgery Z95.828 V45.89   3. Tobacco abuse Z72.0 305.1   4. Essential hypertension I10 401.9   5. Mixed hyperlipidemia E78.2 272.2   6. PAD (peripheral artery disease) (CMS/[facility]) I73.9 443.9       Plan: After thoroughly evaluating Zo Stout, I believe the best course of action is to proceed with thrombectomy of her fem-fem bypass and left fem-pop bypass grafts. Risks/benefits were explained to the patient at great length which include but are not limited to bleeding, infection, vessel damage, nerve damage, and limb loss.  She wants to discuss with her son and will call our office.  We will plan for this within the next 2 weeks.   I did review her testing showing  occlusion of fem-fem and left fem-pop bypass.  I did discuss vascular risk factors as they pertain to the progression of vascular disease including controlling her hypertension, hyperlipidemia, and smoking cessation.  Her hypertension and hyperlipidemia appears stable on her current medication regimen.  I advised the patient of the risks in continuing to use tobacco, and I provided this patient with smoking cessation educational materials.  During this visit, I spent > 3-10 minutes counseling the patient regarding smoking cessation.  Body mass index is 26.95 kg/m².    The patient can continue taking her current medication regimen as previously planned.  This was all discussed in full with complete understanding.    Thank you for allowing me to participate in the care of your patient.  Please do not hesitate with any questions or concerns.  I will keep you aware of any further encounters with Zo Stout.        Sincerely yours,         RUPERT Munoz Gemo Y, MD

## 2020-09-08 NOTE — PROGRESS NOTES
9/8/2020       Kassi Acevedo MD  [address] [facility] [address]    Zo Stout  1940    Chief Complaint   Patient presents with   • Follow-up     2 Week Follow Up to Discuss Surgery. Patient denies any stroke like symptoms.    • Procedure     Patient had surgery last month on August 20, 2020 in [facility].    • Smoker/Non-Smoker     Patient is Current Everyday Smoker - patient has cut back    • Med Management     Verbally verified medications with patient. Ms Stout verbalized all medications are correct and up to date.        Dear Kassi Acevedo MD        HPI  I had the pleasure of seeing your patient Zo Stout in the office today.  As you recall, Zo Stout is a 80 y.o.  female who was seen in the hospital with complaints of chest pain.  She underwent heart catheterization.  After the case was noted to have an acutely ischemic left lower extremity.  She has had multiple vascular interventions by physician in [place].  Dr. Alexis did take her to surgery that day for thrombectomy of the fem-fem bypass graft and her left femoropopliteal bypass.  She is doing well since the procedure.  I did review records from [place].  She is having complaints of weakness to her lower extremities, with existing nerve damage to left leg from previous surgeries.    She does have a history of chronic back pain as well.  She has had injections previously.  She states she has been having complaints of left flank pain and weakness.  Unfortunately, she does smoke about 1/2 pack per day.  She was previously recommended fem-fem graft thrombectomy and left fem bypass graft, however she ended up having surgery in [place].  She states this has been infected, she has a rash, and has questions about anticoagulation.  She is maintained on only aspirin and Lipitor.  On 8/18/2020 she underwent a left axillary to profunda bypass(see below) at [facility]..  They did anything for her left lower extremity bypass being  "occluded.    Records from [facility]: Dr. Varner  Atherosclerosis of native artery of extremity with intermittent claudication   - RANI at OSH non-compressible on left, 1.12 on right, OSH duplex with evidence of occlusion of the distal fem-fem bypass and now flow through the fem-pop bypass.   - CTA with interval development of right common femoral to left popliteal artery bypass thrombosis.   - To OR 8/18 for left axillary to profunda bypass with 6 mm ptfe and vein cuff from left gsv on the profunda side of the anastomosis.   - Postoperatively maintained on OU status with nicardipine infusion. Able to be weaned off without issue and returned to floor status POD1.   - Postoperative course notable for PT/OT eval, pain control, advancement of diet, successful void trial, and routine neurovascular monitoring.   -Left lower extremity signals (AT/PT) intermittently absent. Motor and sensory intact. Patient denies LLE rest pain. Not a candidate for LLE revascularization. Stable at discharge.   -Currently holding Lisinopril (20mg daily) to avoid hypotension with new bypass. Monitor for the ability to restart.   - Continue aspirin and statin.       Review of Systems   Constitutional: Positive for fatigue.   HENT: Negative.    Eyes: Negative.    Respiratory: Positive for shortness of breath.    Cardiovascular: Negative.         Leg pain bilaterally, left slightly worse   Gastrointestinal: Negative.    Endocrine: Negative.    Genitourinary: Negative.    Musculoskeletal: Positive for back pain.   Skin: Positive for rash.   Allergic/Immunologic: Negative.    Neurological: Positive for weakness.   Hematological: Negative.    Psychiatric/Behavioral: Negative.        /82 (BP Location: Right arm, Patient Position: Sitting, Cuff Size: Adult)   Pulse 73   Ht 154.9 cm (61\")   Wt 65.8 kg (145 lb)   SpO2 96%   BMI 27.40 kg/m²   Physical Exam   Constitutional: She is oriented to person, place, and time. Vital signs " are normal. She appears well-developed and well-nourished. No distress.   HENT:   Head: Normocephalic and atraumatic.   Mouth/Throat: Oropharynx is clear and moist.   Eyes: Pupils are equal, round, and reactive to light. No scleral icterus.   Neck: Normal range of motion. Neck supple. No JVD present. Carotid bruit is not present. No thyromegaly present.   Cardiovascular: Normal rate, regular rhythm, S2 normal, normal heart sounds and normal pulses. Exam reveals no gallop and no friction rub.   No murmur heard.  Pulses:       Femoral pulses are 2+ on the right side.       Dorsalis pedis pulses are 2+ on the right side.   Right palpable pulses, left occluded bypass and femfem  Incision to left chest appears healed   Pulmonary/Chest: Effort normal. No respiratory distress. She has decreased breath sounds.   Abdominal: Soft. Normal aorta and bowel sounds are normal. There is no hepatosplenomegaly.   Musculoskeletal: Normal range of motion. She exhibits edema (right 2+).   Neurological: She is alert and oriented to person, place, and time. No cranial nerve deficit.   Skin: Skin is warm and dry. She is not diaphoretic.   Psychiatric: She has a normal mood and affect. Her behavior is normal. Judgment and thought content normal.   Nursing note and vitals reviewed.    Diagnostic Data:    Us Carotid Bilateral    Result Date: 8/11/2020  Narrative: History: Carotid occlusive disease      Impression: Impression: 1. There is less than 50% stenosis of the right internal carotid artery. 2. There is less than 50% stenosis of the left internal carotid artery. 3. Antegrade flow is demonstrated in bilateral vertebral arteries.  Comments: Bilateral carotid vertebral arterial duplex scan was performed.  Grayscale imaging shows intimal thickening and calcified elements at the carotid bifurcation. The right internal carotid artery peak systolic velocity is 53.9 cm/sec. The end-diastolic velocity is 19.3 cm/sec. The right ICA/CCA ratio is  approximately 0.72 . These findings correlate with less than 50% stenosis of the right internal carotid artery.  Grayscale imaging shows intimal thickening and calcified elements at the carotid bifurcation. The left internal carotid artery peak systolic velocity is 79.2 cm/sec. The end-diastolic velocity is 24 cm/sec. The left ICA/CCA ratio is approximately 1.29 . These findings correlate with less than 50% stenosis of the left internal carotid artery.  Antegrade flow is demonstrated in bilateral vertebral arteries.  This report was finalized on 08/11/2020 16:20 by Dr. Nicolás Alexis MD.    Us Ankle / Brachial Indices Extremity Complete    Result Date: 8/11/2020  Narrative:  History: PAD  Comments: Bilateral lower extremity arterial with multi-level pulse volume recordings and segmental pressures were performed at rest and stress.  The right ankle/brachial index is 1.12. The waveforms are triphasic without dampening.These findings are consistent with no significant arterial insufficiency of the right lower extremity at rest.  The left ankle/brachial index is not obtainable due to noncompressibility of the vessels. The waveforms are monophasic and flat lined. These findings are consistent with severe arterial insufficiency of the left lower extremity at rest.      Impression: Impression: 1. No significant arterial insufficiency of the right lower extremity at rest. 2. Severe arterial insufficiency of the left lower extremity at rest.   This report was finalized on 08/11/2020 16:07 by Dr. Nicolás Alexis MD.    Us Arterial Doppler Lower Extremity Complete    Result Date: 8/11/2020  Narrative:  History: PAD  Comments: Gray scale imaging as well as color flow duplex were used to evaluate the right-to-left femorofemoral bypass graft as well as the left femoropopliteal bypass graft.  The peak systolic velocity of the femorofemoral bypass graft at the proximal anastomosis in the right groin measured 31.4 cm/s. In the  proximal graft measured 14.7 cm/s. The mid and distal graft is occluded. The left femoropopliteal bypass graft is also occluded.  The peak systolic velocity in the right common femoral artery measured 301 cm/s. In the proximal SFA measured 155 cm/s. In the mid SFA measured 155 cm/s. In the distal SFA measured 146 cm/s. In the popliteal artery measured 63.3 cm/s. In the posterior tibial artery measured 58.5 cm/s. In the anterior tibial artery measured 67.6 cm/s.  In the left lower extremity the peak systolic velocity in the mid SFA measured 31.9 cm/s. In the popliteal artery measured 14 cm/s. In the posterior tibial artery measured 11.2 cm/s. In the anterior tibial artery measured 12.4 cm/s.      Impression: Occluded right to left femorofemoral bypass graft as well as an occluded left femoropopliteal bypass graft.    This report was finalized on 08/11/2020 16:10 by Dr. Nicolás Alexis MD.        Patient Active Problem List   Diagnosis   • ST elevation myocardial infarction involving right coronary artery (CMS/[facility])   • PAD (peripheral artery disease) (CMS/[facility])   • Chronic bronchitis (CMS/[facility])   • Tobacco consumption   • Tobacco abuse   • Coronary artery disease involving native coronary artery of native heart without angina pectoris   • Weakness   • Peripheral vascular disease (CMS/[facility])   • Essential hypertension   • Mixed hyperlipidemia         ICD-10-CM ICD-9-CM   1. PAD (peripheral artery disease) (CMS/[facility]) I73.9 443.9   2. Abdominal aortic aneurysm (AAA) without rupture (CMS/[facility]) I71.4 441.4   3. Essential hypertension I10 401.9   4. Mixed hyperlipidemia E78.2 272.2   5. Tobacco abuse Z72.0 305.1       Plan: After thoroughly evaluating Zo Stout, I believe the best course of action is to follow-up with Dr. Varner for postoperative follow-up.  She has questions regarding anticoagulation as she was previously anticoagulated and is now only on aspirin.  She has a rash she has developed.  We will follow in 6  months for continued follow-up of her vascular disease with repeat noninvasive testing for continued surveillance, including an ultrasound of the aorta, ABIs, and the left lower extremity duplex to assess left axillary to profunda bypass.  We had previously discussed intervention including a thrombectomy of her femorofemoral bypass and left hand pop bypass graft however she proceeded to go to [place] for surgery.  At that time, she was going to discuss with her son and call our office back to schedule surgery.   I did discuss vascular risk factors as they pertain to the progression of vascular disease including controlling her hypertension, hyperlipidemia, and smoking cessation.  Her blood pressure is stable on her current medication regimen.  She is maintained on Lipitor for her hyperlipidemia.   I advised the patient of the risks in continuing to use tobacco, and I provided this patient with smoking cessation educational materials.  She is not interested in smoking cessation at this time.  Patient's Body mass index is 27.4 kg/m². BMI is above normal parameters. Recommendations include: educational material.   The patient can continue taking her current medication regimen as previously planned.  This was all discussed in full with complete understanding.    Thank you for allowing me to participate in the care of your patient.  Please do not hesitate with any questions or concerns.  I will keep you aware of any further encounters with Zo Stout.        Sincerely yours,         RUPERT Munoz Gemo Y, MD

## 2021-01-01 ENCOUNTER — TRANSCRIBE ORDERS (OUTPATIENT)
Dept: LAB | Facility: HOSPITAL | Age: 81
End: 2021-01-01

## 2021-01-01 ENCOUNTER — HOSPITAL ENCOUNTER (OUTPATIENT)
Dept: ULTRASOUND IMAGING | Facility: HOSPITAL | Age: 81
Discharge: HOME | End: 2021-03-11

## 2021-01-01 ENCOUNTER — HOSPITAL ENCOUNTER (OUTPATIENT)
Dept: PULMONOLOGY | Facility: HOSPITAL | Age: 81
Discharge: HOME | End: 2021-03-08
Admitting: FAMILY MEDICINE

## 2021-01-01 ENCOUNTER — LAB (OUTPATIENT)
Dept: LAB | Facility: HOSPITAL | Age: 81
End: 2021-01-01

## 2021-01-01 ENCOUNTER — APPOINTMENT (OUTPATIENT)
Dept: PULMONOLOGY | Facility: HOSPITAL | Age: 81
End: 2021-01-01

## 2021-01-01 ENCOUNTER — TELEPHONE (OUTPATIENT)
Dept: VASCULAR SURGERY | Facility: CLINIC | Age: 81
End: 2021-01-01

## 2021-01-01 ENCOUNTER — OFFICE VISIT (OUTPATIENT)
Dept: VASCULAR SURGERY | Facility: CLINIC | Age: 81
End: 2021-01-01

## 2021-01-01 VITALS
BODY MASS INDEX: 27.56 KG/M2 | DIASTOLIC BLOOD PRESSURE: 84 MMHG | SYSTOLIC BLOOD PRESSURE: 132 MMHG | WEIGHT: 146 LBS | HEART RATE: 66 BPM | HEIGHT: 61 IN | OXYGEN SATURATION: 95 %

## 2021-01-01 DIAGNOSIS — I10 ESSENTIAL HYPERTENSION: ICD-10-CM

## 2021-01-01 DIAGNOSIS — I73.9 PAD (PERIPHERAL ARTERY DISEASE) (HCC): ICD-10-CM

## 2021-01-01 DIAGNOSIS — Z72.0 TOBACCO ABUSE: ICD-10-CM

## 2021-01-01 DIAGNOSIS — I71.40 ABDOMINAL AORTIC ANEURYSM (AAA) WITHOUT RUPTURE (HCC): ICD-10-CM

## 2021-01-01 DIAGNOSIS — R06.02 SHORTNESS OF BREATH: ICD-10-CM

## 2021-01-01 DIAGNOSIS — Z01.818 PRE-OP TESTING: Primary | ICD-10-CM

## 2021-01-01 DIAGNOSIS — Z01.818 PREOP TESTING: Primary | ICD-10-CM

## 2021-01-01 DIAGNOSIS — I73.9 PAD (PERIPHERAL ARTERY DISEASE) (HCC): Primary | ICD-10-CM

## 2021-01-01 DIAGNOSIS — E78.2 MIXED HYPERLIPIDEMIA: ICD-10-CM

## 2021-01-01 LAB — SARS-COV-2 ORF1AB RESP QL NAA+PROBE: NOT DETECTED

## 2021-01-01 PROCEDURE — 93923 UPR/LXTR ART STDY 3+ LVLS: CPT

## 2021-01-01 PROCEDURE — 94729 DIFFUSING CAPACITY: CPT

## 2021-01-01 PROCEDURE — 93923 UPR/LXTR ART STDY 3+ LVLS: CPT | Performed by: SURGERY

## 2021-01-01 PROCEDURE — C9803 HOPD COVID-19 SPEC COLLECT: HCPCS | Performed by: FAMILY MEDICINE

## 2021-01-01 PROCEDURE — 76775 US EXAM ABDO BACK WALL LIM: CPT

## 2021-01-01 PROCEDURE — 94060 EVALUATION OF WHEEZING: CPT | Performed by: INTERNAL MEDICINE

## 2021-01-01 PROCEDURE — 93926 LOWER EXTREMITY STUDY: CPT

## 2021-01-01 PROCEDURE — 93926 LOWER EXTREMITY STUDY: CPT | Performed by: SURGERY

## 2021-01-01 PROCEDURE — 99214 OFFICE O/P EST MOD 30 MIN: CPT | Performed by: SURGERY

## 2021-01-01 PROCEDURE — U0005 INFEC AGEN DETEC AMPLI PROBE: HCPCS | Performed by: FAMILY MEDICINE

## 2021-01-01 PROCEDURE — 94729 DIFFUSING CAPACITY: CPT | Performed by: INTERNAL MEDICINE

## 2021-01-01 PROCEDURE — 94726 PLETHYSMOGRAPHY LUNG VOLUMES: CPT | Performed by: INTERNAL MEDICINE

## 2021-01-01 PROCEDURE — 94060 EVALUATION OF WHEEZING: CPT

## 2021-01-01 PROCEDURE — U0004 COV-19 TEST NON-CDC HGH THRU: HCPCS | Performed by: FAMILY MEDICINE

## 2021-01-01 PROCEDURE — 94726 PLETHYSMOGRAPHY LUNG VOLUMES: CPT

## 2021-01-01 RX ORDER — TIZANIDINE 4 MG/1
4 TABLET ORAL EVERY 8 HOURS PRN
COMMUNITY
Start: 2020-01-01

## 2021-01-01 RX ORDER — ALBUTEROL SULFATE 2.5 MG/3ML
2.5 SOLUTION RESPIRATORY (INHALATION) ONCE
Status: COMPLETED | OUTPATIENT
Start: 2021-01-01 | End: 2021-01-01

## 2021-01-01 RX ADMIN — ALBUTEROL SULFATE 2.5 MG: 2.5 SOLUTION RESPIRATORY (INHALATION) at 14:55

## 2021-03-10 NOTE — TELEPHONE ENCOUNTER
Spoke with Ms Stout reminding her of her appointments for Thursday, March 11th, 2021. Reminded Ms Stout to arrive at the [facility] at 8 am for 830 am for testing with nothing to eat or drink after midnight and follow up afterwards at 1115 am with Dr Alexis. Ms Stout confirmed she would be here.

## 2021-03-11 NOTE — PROGRESS NOTES
3/11/2021       Joe Acevedo MD  [id number] [facility] [id number]    Zo Stout  1940    Chief Complaint   Patient presents with   • Follow-up     6 Month Follow Up For Peripheral Artery Stenosis and Abdominal Aortic Aneurysm. Test [id number] US pad ankle / brach ind ext comp US pad lower ext arteries left, and US pad aorta miramontes. Patient denies any stroke like symptoms.    • Smoker     Patient is a Current Everyday Smoker    • other     Patient states she is very weak, muscle cramps, and pain in bilateral leg pain    • Med Management     Verbally verified medicaitons with patient        Dear Joe Acevedo MD        HPI  I had the pleasure of seeing your patient Zo Stout in the office today.  As you recall, Zo Stout is a 80 y.o.  female who was seen in the hospital with complaints of chest pain.  She underwent heart catheterization.  After the case was noted to have an acutely ischemic left lower extremity.  She has had multiple vascular interventions by a physician in [place].  Dr. Alexis did take her to surgery that day for thrombectomy of the fem-fem bypass graft and her left femoropopliteal bypass.  Unfortunately, she does smoke about 1/2 pack per day.  She has an occluded right axillofemoral bypass, left axillofemoral bypass, femorofemoral bypass, and left femoropopliteal bypass graft.  Currently, she appears to be doing well without any significant complaints.  She did have noninvasive testing performed which I personally reviewed here today.    Records from [facility]: Dr. Varner  Atherosclerosis of native artery of extremity with intermittent claudication   - RANI at OSH non-compressible on left, 1.12 on right, OSH duplex with evidence of occlusion of the distal fem-fem bypass and now flow through the fem-pop bypass.   - CTA with interval development of right common femoral to left popliteal artery bypass thrombosis.   - To OR 8/18 for left axillary to profunda  "bypass with 6 mm ptfe and vein cuff from left gsv on the profunda side of the anastomosis.   - Postoperatively maintained on OU status with nicardipine infusion. Able to be weaned off without issue and returned to floor status POD1.   - Postoperative course notable for PT/OT eval, pain control, advancement of diet, successful void trial, and routine neurovascular monitoring.   -Left lower extremity signals (AT/PT) intermittently absent. Motor and sensory intact. Patient denies LLE rest pain. Not a candidate for LLE revascularization. Stable at discharge.   -Currently holding Lisinopril (20mg daily) to avoid hypotension with new bypass. Monitor for the ability to restart.   - Continue aspirin and statin.       Review of Systems   Constitutional: Positive for fatigue.   HENT: Negative.    Eyes: Negative.    Respiratory: Positive for shortness of breath.    Cardiovascular: Negative.         Leg pain bilaterally, left slightly worse   Gastrointestinal: Negative.    Endocrine: Negative.    Genitourinary: Negative.    Musculoskeletal: Positive for back pain.   Skin: Positive for rash.   Allergic/Immunologic: Negative.    Neurological: Positive for weakness.   Hematological: Negative.    Psychiatric/Behavioral: Negative.        /84 (BP Location: Right arm, Patient Position: Sitting, Cuff Size: Adult)   Pulse 66   Ht 154.9 cm (61\")   Wt 66.2 kg (146 lb)   SpO2 95%   BMI 27.59 kg/m²   Physical Exam   Constitutional: She is oriented to person, place, and time. She appears well-developed. No distress.   HENT:   Head: Normocephalic and atraumatic.   Eyes: Pupils are equal, round, and reactive to light. No scleral icterus.   Neck: No JVD present. Carotid bruit is not present. No thyromegaly present.   Cardiovascular: Normal rate, regular rhythm, S2 normal, normal heart sounds and normal pulses. Exam reveals no gallop and no friction rub.   No murmur heard.  Pulses:       Femoral pulses are 2+ on the right side.       " Dorsalis pedis pulses are 2+ on the right side.   Right palpable pulses, left occluded bypass and femfem  Incision to left chest appears healed   Pulmonary/Chest: Effort normal. No respiratory distress. She has decreased breath sounds.   Abdominal: Soft. Bowel sounds are normal.   Musculoskeletal: Normal range of motion.   Neurological: She is alert and oriented to person, place, and time. No cranial nerve deficit.   Skin: Skin is warm and dry. She is not diaphoretic.   Psychiatric: Her behavior is normal. Judgment and thought content normal.   Nursing note and vitals reviewed.    Diagnostic Data:    Full Pulmonary Function Test With Bronchodilator    Result Date: 3/8/2021  Narrative: [facility] - Pulmonary Function Test [address] [place] [address]  [id number] [phone] Patient : Zo Stout MRN : [id number] CSN : [id number] Pulmonologist : Aj Heard MD Date : 3/8/2021 ______________________________________________________________________ Interpretation : 1.  Spirometry is most consistent with a restrictive ventilatory defect with coexisting small airways disease although a coexisting obstructive ventilatory defect could still be present as the patient had some difficulty in performing the FVC maneuver which could affect the results of this study. 2.  Postbronchodilator spirometry shows improvement in both the patient's FVC and FEV1 but to a greater degree in the FVC.  As a result postbronchodilator spirometry is most consistent with a moderate obstructive ventilatory defect. 3.  Lung volumes reveal borderline hyperinflation but no evidence of restriction by lung volume criteria. 4.  There is a moderate diffusion impairment which when corrected for alveolar volume is a very mild diffusion impairment. Aj Heard MD     US Arterial Doppler Lower Extremity Left    Result Date: 3/11/2021  Narrative:  History: PAD.  Comments: Gray scale imaging as well as color flow duplex were used to  evaluate bilateral axillofemoral bypass grafts, femorofemoral bypass graft, and left femoropopliteal bypass graft.  The right axillofemoral bypass graft, left axillofemoral bypass graft, femorofemoral bypass graft, and left femoropopliteal bypass graft are all occluded. The left common femoral artery appears occluded. There is flow in the profunda femoris artery measured 24.4 cm/s. There is reconstitution of the distal SFA with flow measured at 40.5 cm/s. In the left popliteal artery measured 23.3 cm/s. In the posterior tibial artery measured 13.3 cm/s. In the anterior tibial artery measured 20.1 cm/s.      Impression: Occluded right axillofemoral bypass graft, left axillofemoral bypass graft, femorofemoral bypass graft, and left femoropopliteal bypass graft.   This report was finalized on 03/11/2021 13:05 by Dr. Nicolás Alexis MD.    US Ankle / Brachial Indices Extremity Complete    Result Date: 3/11/2021  Narrative:  History: PAD  Comments: Bilateral lower extremity arterial with multi-level pulse volume recordings and segmental pressures were performed at rest and stress.  The right ankle/brachial index is 1.13. The waveforms are triphasic without dampening.These findings are consistent with no significant arterial insufficiency of the right lower extremity at rest.  The left ankle/brachial index is 0.4. The waveforms are biphasic and dampened at the ankle. These findings are consistent with severe arterial insufficiency of the left lower extremity at rest.      Impression: Impression: 1. No significant arterial insufficiency of the right lower extremity at rest. 2. Severe arterial insufficiency of the left lower extremity at rest.   This report was finalized on 03/11/2021 13:02 by Dr. Nicolás Alexis MD.    US Aorta Limited    Result Date: 3/11/2021  Narrative: ULTRASOUND AORTA 3/11/2021 8:29 AM CST  HISTORY: AAA  COMPARISON: CT scan dated 5/20/2020  TECHNIQUE: Transverse and longitudinal sonographic images  of the abdominal aorta were obtained.  FINDINGS:  The proximal aorta measures 2.9 x 3.4 cm. The mid aorta measures 4.1 x 4.2 cm. The distal aorta measures 3.5 x 3.6 cm. The aortoiliac bifurcation is normal in appearance.      Impression: 1. Fusiform aneurysm of the suprarenal and infrarenal abdominal aorta, caliber of which is stable. Aneurysm sac measures up to 4.2 cm at the level of the mid aorta. Endograft is present although flow is not appreciated in the left iliac limb on the last image (image 16). This appears to be a known finding when comparing with the earlier CT scan from May 2020. This report was finalized on 03/11/2021 09:11 by Dr Enoc Servin, .        Patient Active Problem List   Diagnosis   • ST elevation myocardial infarction involving right coronary artery (CMS/HCC)   • PAD (peripheral artery disease) (CMS/[facility])   • Chronic bronchitis (CMS/[facility])   • Tobacco consumption   • Tobacco abuse   • Coronary artery disease involving native coronary artery of native heart without angina pectoris   • Weakness   • Peripheral vascular disease (CMS/[facility])   • Essential hypertension   • Mixed hyperlipidemia         ICD-10-CM ICD-9-CM   1. PAD (peripheral artery disease) (CMS/HCC)  I73.9 443.9   2. Essential hypertension  I10 401.9   3. Mixed hyperlipidemia  E78.2 272.2   4. Tobacco abuse  Z72.0 305.1       Plan: After thoroughly evaluating Zo Stout, I believe the best course of action is to remain conservative from a vascular surgery standpoint.  Her testing is unchanged and all of her bypass grafts are occluded.  She remains asymptomatic with regards to lower extremity ischemia or claudication.  She has also continued follow-up with Dr. Varner in [place].  We will follow in 6 months for continued follow-up of her vascular disease with repeat noninvasive testing for continued surveillance, including ABIs.  I did discuss vascular risk factors as they pertain to the progression of vascular disease  including controlling her hypertension, hyperlipidemia, and smoking cessation.  Her blood pressure is stable on her current medication regimen.  She is maintained on Lipitor for her hyperlipidemia.   I advised the patient of the risks in continuing to use tobacco, and I provided this patient with smoking cessation educational materials.  She is not interested in smoking cessation at this time.  Patient's Body mass index is 27.59 kg/m². BMI is above normal parameters. Recommendations include: educational material.   The patient can continue taking her current medication regimen as previously planned.  This was all discussed in full with complete understanding.    Thank you for allowing me to participate in the care of your patient.  Please do not hesitate with any questions or concerns.  I will keep you aware of any further encounters with Zo Stout.        Sincerely yours,         DO Curtis Deluca Nelson Kirk, MD

## 2021-09-13 ENCOUNTER — TELEPHONE (OUTPATIENT)
Dept: VASCULAR SURGERY | Facility: CLINIC | Age: 81
End: 2021-09-13

## 2021-09-13 NOTE — TELEPHONE ENCOUNTER
Tried calling to remind Ms Stout of her appointments for Tuesday, September 14th, 2021. [facility] for 9 o'clock testing and follow up afterwards at 1030 am with Dr Alexis.     When calling it stated the number you dialed has been changed, disconnected or no longer in service.

## 2021-09-14 ENCOUNTER — HOSPITAL ENCOUNTER (OUTPATIENT)
Dept: ULTRASOUND IMAGING | Facility: HOSPITAL | Age: 81
End: 2021-09-14

## (undated) DEVICE — 6F .070 JR 4 SH 100CM: Brand: VISTA BRITE TIP

## (undated) DEVICE — SOLIDIFIER LIQUI LOC PLUS 2000CC

## (undated) DEVICE — SUT MNCRYL 4/0 PS2 27IN UD MCP426H

## (undated) DEVICE — DEV STBL SHEATH STATLOCK PSI

## (undated) DEVICE — CATH FLSH OMNI SFT 5F 90CM

## (undated) DEVICE — GEL ULTRASND HI VISC 20G PACKET STRL

## (undated) DEVICE — IRRIGATOR BULB ASEPTO 60CC STRL

## (undated) DEVICE — GW STARTER FXD CORE J .035 3X150CM 3MM

## (undated) DEVICE — PINNACLE INTRODUCER SHEATH: Brand: PINNACLE

## (undated) DEVICE — MODEL AT P65, P/N 701554-001KIT CONTENTS: HAND CONTROLLER, 3-WAY HIGH-PRESSURE STOPCOCK WITH ROTATING END AND PREMIUM HIGH-PRESSURE TUBING: Brand: ANGIOTOUCH® KIT

## (undated) DEVICE — SUT PROLN 6/0 4/24IN BV1 MON BL M8805

## (undated) DEVICE — ANTIBACTERIAL UNDYED BRAIDED (POLYGLACTIN 910), SYNTHETIC ABSORBABLE SUTURE: Brand: COATED VICRYL

## (undated) DEVICE — PRESSURE MONITORING SET: Brand: TRUWAVE

## (undated) DEVICE — PROXIMATE RH ROTATING HEAD SKIN STAPLERS (35 WIDE) CONTAINS 35 STAINLESS STEEL STAPLES: Brand: PROXIMATE

## (undated) DEVICE — TOWEL,OR,DSP,ST,BLUE,DLX,10/PK,8PK/CS: Brand: MEDLINE

## (undated) DEVICE — SPNG GZ STRL 2S 4X4 12PLY

## (undated) DEVICE — SOL NS 500ML

## (undated) DEVICE — LP VESL MINI BLU PK/2

## (undated) DEVICE — BG PRESS INFSR 500CC

## (undated) DEVICE — INTENDED FOR TISSUE SEPARATION, AND OTHER PROCEDURES THAT REQUIRE A SHARP SURGICAL BLADE TO PUNCTURE OR CUT.: Brand: BARD-PARKER ® STAINLESS STEEL BLADES

## (undated) DEVICE — SCANLAN® SURG-I-PAW® INSTRUMENT COVERS - RED, 1/10" X 5"/ 3 MMX13 CM (2 - 5" PCS /PKG): Brand: SCANLAN® SURG-I-PAW® INSTRUMENT COVERS

## (undated) DEVICE — CATH DIAG IMPULSE M/ PK 145 5FR

## (undated) DEVICE — SOL IRR NACL 0.9PCT BT 1000ML

## (undated) DEVICE — GW STARTER FXD CORE J .035 3X260CM 3MM

## (undated) DEVICE — TB SXN SURG DLP MALL/CARD SFT/TP 6F 3IN

## (undated) DEVICE — DESTINATION RENAL GUIDING SHEATH: Brand: DESTINATION

## (undated) DEVICE — GLV SURG NEOLON 2G PF LF 7.5 STRL

## (undated) DEVICE — GAUZE,SPONGE,4"X4",16PLY,XRAY,STRL,LF: Brand: MEDLINE

## (undated) DEVICE — A2000 MULTI-USE SYRINGE KIT, P/N 701277-003KIT CONTENTS: 100ML CONTRAST RESERVOIR AND TUBING WITH CONTRAST SPIKE AND CLAMP: Brand: A2000 MULTI-USE SYRINGE KIT

## (undated) DEVICE — TUBING, SUCTION, 1/4" X 12', STRAIGHT: Brand: MEDLINE

## (undated) DEVICE — PENCL E/S HNDSWCH ROCKR CB

## (undated) DEVICE — SNAP KOVER: Brand: UNBRANDED

## (undated) DEVICE — APPL CHLORAPREP W/TINT 26ML ORNG

## (undated) DEVICE — PK CATH CARD 30

## (undated) DEVICE — 3M™ IOBAN™ 2 ANTIMICROBIAL INCISE DRAPE 6651EZ: Brand: IOBAN™ 2

## (undated) DEVICE — TREK CORONARY DILATATION CATHETER 2.50 MM X 20 MM / RAPID-EXCHANGE: Brand: TREK

## (undated) DEVICE — SUT PROLN 5/0 C1 DA 24IN 8725H

## (undated) DEVICE — CANN VESL ACRN TP 4MM

## (undated) DEVICE — ELECTRD PAD DEFIB A/

## (undated) DEVICE — NDL CNTR 40CT FM MAG: Brand: MEDLINE INDUSTRIES, INC.

## (undated) DEVICE — 3M™ STERI-DRAPE™ ISOLATION BAG, 10 PER CARTON / 4 CARTONS PER CASE, 1003: Brand: 3M™ STERI-DRAPE™

## (undated) DEVICE — PK TURNOVER RM ADV

## (undated) DEVICE — PAD MAJOR VASCULAR: Brand: MEDLINE INDUSTRIES, INC.

## (undated) DEVICE — CANN CO2/O2 NASL A/

## (undated) DEVICE — MYNX ACE VASCULAR CLOSURE DEVICE (6F/7F): Brand: MYNX ACE VASCULAR CLOSURE DEVICE (6F/7F)

## (undated) DEVICE — NDL HYPO PRECISIONGLIDE REG 25G 1 1/2

## (undated) DEVICE — DRSNG SURESITE WNDW 4X4.5

## (undated) DEVICE — DRSNG TELFA PAD NONADH STR 1S 3X8IN

## (undated) DEVICE — LP VESL MAXI RED 2PK

## (undated) DEVICE — FOGARTY ARTERIAL EMBOLECTOMY CATHETER 4F 80CM: Brand: FOGARTY

## (undated) DEVICE — IMMOB KN 3PNL DLX CANVS 19IN BLU

## (undated) DEVICE — NC TREK CORONARY DILATATION CATHETER 2.75 MM X 20 MM / RAPID-EXCHANGE: Brand: NC TREK

## (undated) DEVICE — MODEL BT2000 P/N 700287-012KIT CONTENTS: MANIFOLD WITH SALINE AND CONTRAST PORTS, SALINE TUBING WITH SPIKE AND HAND SYRINGE, TRANSDUCER: Brand: BT2000 AUTOMATED MANIFOLD KIT